# Patient Record
Sex: FEMALE | Race: WHITE | NOT HISPANIC OR LATINO | Employment: OTHER | URBAN - METROPOLITAN AREA
[De-identification: names, ages, dates, MRNs, and addresses within clinical notes are randomized per-mention and may not be internally consistent; named-entity substitution may affect disease eponyms.]

---

## 2018-10-09 ENCOUNTER — OFFICE VISIT (OUTPATIENT)
Dept: OPHTHALMOLOGY | Facility: CLINIC | Age: 78
End: 2018-10-09
Payer: COMMERCIAL

## 2018-10-09 DIAGNOSIS — H52.4 PRESBYOPIA: ICD-10-CM

## 2018-10-09 DIAGNOSIS — H43.812 POSTERIOR VITREOUS DETACHMENT, LEFT: Primary | ICD-10-CM

## 2018-10-09 PROCEDURE — 92014 COMPRE OPH EXAM EST PT 1/>: CPT | Performed by: OPHTHALMOLOGY

## 2018-10-09 PROCEDURE — 92015 DETERMINE REFRACTIVE STATE: CPT | Performed by: OPHTHALMOLOGY

## 2018-10-09 ASSESSMENT — CONF VISUAL FIELD
OD_NORMAL: 1
OS_NORMAL: 1

## 2018-10-09 ASSESSMENT — REFRACTION_WEARINGRX
OS_CYLINDER: +1.00
OD_SPHERE: +3.00
OS_SPHERE: +2.50
OS_ADD: +2.52
OD_ADD: +2.45
OD_CYLINDER: +1.00
OS_AXIS: 013
SPECS_TYPE: PAL
OD_AXIS: 011

## 2018-10-09 ASSESSMENT — VISUAL ACUITY
CORRECTION_TYPE: GLASSES
OD_CC: 20/25
OD_CC+: -2
OS_CC: 20/25
METHOD: SNELLEN - LINEAR
OS_CC+: +1

## 2018-10-09 ASSESSMENT — REFRACTION_MANIFEST
OD_SPHERE: +3.00
OS_SPHERE: +2.50
OS_ADD: +2.75
OS_AXIS: 170
OS_CYLINDER: +1.00
OD_ADD: +2.75
OD_CYLINDER: +1.00
OD_AXIS: 005

## 2018-10-09 ASSESSMENT — SLIT LAMP EXAM - LIDS: COMMENTS: 2+ DERMATOCHALASIS - UPPER LID

## 2018-10-09 ASSESSMENT — EXTERNAL EXAM - RIGHT EYE: OD_EXAM: NORMAL

## 2018-10-09 ASSESSMENT — TONOMETRY
OD_IOP_MMHG: 18
OS_IOP_MMHG: 18
IOP_METHOD: APPLANATION

## 2018-10-09 ASSESSMENT — CUP TO DISC RATIO
OS_RATIO: 0.4
OD_RATIO: 0.4

## 2018-10-09 ASSESSMENT — EXTERNAL EXAM - LEFT EYE: OS_EXAM: NORMAL

## 2018-10-09 NOTE — MR AVS SNAPSHOT
After Visit Summary   10/9/2018    Vivian Carlin    MRN: [id number]           Patient Information     Date Of Birth          1940        Visit Information        Provider Department      10/9/2018 1:00 PM Augusto Babin MD [facility]        Today's Diagnoses     Presbyopia    -  1    Posterior vitreous detachment, left          Care Instructions    Glasses Rx given - optional.  Use artificial tears up to 4 times daily both eyes.  (Refresh Tears, Systane Ultra/Balance, or Theratears)  Possible posterior vitreous detachment (sudden onset large floater and/or flashing lights) right eye discussed.  Call in June 2019 for an appointment in October 2019 for Complete Exam.    Dr. Babin [phone]            Follow-ups after your visit        Who to contact     If you have questions or need follow up information about today's clinic visit or your schedule please contact [facility] directly at [phone].  Normal or non-critical lab and imaging results will be communicated to you by MyChart, letter or phone within 4 business days after the clinic has received the results. If you do not hear from us within 7 days, please contact the clinic through MyChart or phone. If you have a critical or abnormal lab result, we will notify you by phone as soon as possible.  Submit refill requests through iLive or call your pharmacy and they will forward the refill request to us. Please allow 3 business days for your refill to be completed.          Additional Information About Your Visit        Care EveryWhere ID     This is your Care EveryWhere ID. This could be used by other organizations to access your [place] medical records  [id number]         Blood Pressure from Last 3 Encounters:   No data found for BP    Weight from Last 3 Encounters:   05/07/04 74.4 kg (164 lb)              Today, you had the following     No orders found for display       Primary Care Provider Office  Phone # Fax #    Hazel Meza [phone]       [facility] [address] [facility] [id number]        Equal Access to Services     DRAKEWINSOME HAO : Hadii aad ku hadchauritchie Lawandadonna, wajayceda joefranciscoha, moneta kaedmundo hayes, clarisa weber ashleyaguila landry lasimonalorena montgomery. So [facility] [phone].    ATENCIÓN: Si habla español, tiene a stephen disposición servicios gratuitos de asistencia lingüística. Llame al [phone].    We comply with applicable federal civil rights laws and [place] laws. We do not discriminate on the basis of race, color, national origin, age, disability, sex, sexual orientation, or gender identity.            Thank you!     Thank you for choosing [facility]  for your care. Our goal is always to provide you with excellent care. Hearing back from our patients is one way we can continue to improve our services. Please take a few minutes to complete the written survey that you may receive in the mail after your visit with us. Thank you!             Your Updated Medication List - Protect others around you: Learn how to safely use, store and throw away your medicines at www.disposemymeds.org.          This list is accurate as of 10/9/18  2:05 PM.  Always use your most recent med list.                   Brand Name Dispense Instructions for use Diagnosis    aspirin 325 MG tablet      Take  by mouth daily.        ATENOLOL PO      Take 1 tablet by mouth once.        CALCIUM + D PO      Take  by mouth.        hydrochlorothiazide 12.5 MG capsule    MICROZIDE     Take 12.5 mg by mouth daily.        immune globulin (IM only) injection    GAMASTAN    1.5ml    IM        LIPITOR 10 MG tablet   Generic drug:  atorvastatin      Take 10 mg by mouth daily.        LISINOPRIL PO      Take 1 tablet by mouth once.        MULTI-VITAMIN DAILY Tabs      Take  by mouth.        olopatadine 0.1 % ophthalmic solution    PATANOL    5 mL    Place 1 drop into both eyes 2 times daily as needed for  allergies.    Allergic conjunctivitis

## 2018-10-09 NOTE — PROGRESS NOTES
Current Eye Medications:  Artificial tears as needed both eyes     Subjective:  Here for complete today.  Not interested in new glasses today.  High blood pressure, 139/87 this am, which is high for her.  She takes meds for this but then when she exercises it goes down to 112/72, so they do not want her to change meds in fear it goes too low. Afib as well so it does slow her pulse with the meds too.    Objective:  See Ophthalmology Exam.       Assessment:  Stable eye exam.      ICD-10-CM    1. Posterior vitreous detachment, left H43.812 EYE EXAM (SIMPLE-NONBILLABLE)   2. Presbyopia H52.4 REFRACTIVE STATUS        Plan:  Glasses Rx given - optional.  Use artificial tears up to 4 times daily both eyes.  (Refresh Tears, Systane Ultra/Balance, or Theratears)  Possible posterior vitreous detachment (sudden onset large floater and/or flashing lights) right eye discussed.  Call in June 2019 for an appointment in October 2019 for Complete Exam.    Dr. Babin [phone]

## 2018-10-09 NOTE — LETTER
10/9/2018         RE: Vivian Carlin  [address] [place] [address]        Dear Colleague,    Thank you for referring your patient, Vivian Carlin, to the [facility]. Please see a copy of my visit note below.     Current Eye Medications:  Artificial tears as needed both eyes     Subjective:  Here for complete today.  Not interested in new glasses today.  High blood pressure, 139/87 this am, which is high for her.  She takes meds for this but then when she exercises it goes down to 112/72, so they do not want her to change meds in fear it goes too low. Afib as well so it does slow her pulse with the meds too.    Objective:  See Ophthalmology Exam.       Assessment:  Stable eye exam.      ICD-10-CM    1. Posterior vitreous detachment, left H43.812 EYE EXAM (SIMPLE-NONBILLABLE)   2. Presbyopia H52.4 REFRACTIVE STATUS        Plan:  Glasses Rx given - optional.  Use artificial tears up to 4 times daily both eyes.  (Refresh Tears, Systane Ultra/Balance, or Theratears)  Possible posterior vitreous detachment (sudden onset large floater and/or flashing lights) right eye discussed.  Call in June 2019 for an appointment in October 2019 for Complete Exam.    Dr. Babin [phone]           Again, thank you for allowing me to participate in the care of your patient.        Sincerely,        Augusto Babin MD

## 2018-10-09 NOTE — PATIENT INSTRUCTIONS
Glasses Rx given - optional.  Use artificial tears up to 4 times daily both eyes.  (Refresh Tears, Systane Ultra/Balance, or Theratears)  Possible posterior vitreous detachment (sudden onset large floater and/or flashing lights) right eye discussed.  Call in June 2019 for an appointment in October 2019 for Complete Exam.    Dr. Babin [phone]

## 2019-06-10 ENCOUNTER — TELEPHONE (OUTPATIENT)
Dept: OPHTHALMOLOGY | Facility: CLINIC | Age: 79
End: 2019-06-10

## 2019-06-10 NOTE — TELEPHONE ENCOUNTER
Reason for Call:  Other appointment    Detailed comments: Patient would like a return call to discuss double vision and getting an appointment asap due to travel. Please call to discuss thank you.     Phone Number Patient can be reached at: Other phone number:  [phone]    Best Time: any    Can we leave a detailed message on this number? YES    Call taken on 6/10/2019 at 9:08 AM by LUCAS ALAN

## 2019-06-11 ENCOUNTER — OFFICE VISIT (OUTPATIENT)
Dept: OPHTHALMOLOGY | Facility: CLINIC | Age: 79
End: 2019-06-11
Payer: MEDICARE

## 2019-06-11 DIAGNOSIS — H49.11 SUPERIOR OBLIQUE PALSY, RIGHT: Primary | ICD-10-CM

## 2019-06-11 PROCEDURE — 92012 INTRM OPH EXAM EST PATIENT: CPT | Performed by: OPHTHALMOLOGY

## 2019-06-11 ASSESSMENT — REFRACTION_WEARINGRX
OD_AXIS: 011
OS_ADD: +2.52
OS_AXIS: 013
SPECS_TYPE: PAL
OS_SPHERE: +2.50
OS_CYLINDER: +1.00
OD_ADD: +2.45
OD_CYLINDER: +1.00
OD_SPHERE: +3.00

## 2019-06-11 ASSESSMENT — VISUAL ACUITY
OS_CC: 20/25
OD_CC: 20/25
OS_CC+: -1+1
METHOD: SNELLEN - LINEAR
CORRECTION_TYPE: GLASSES
OD_CC+: -1

## 2019-06-11 ASSESSMENT — REFRACTION_FINALRX: OS_VPRISM: 4.0

## 2019-06-11 ASSESSMENT — SLIT LAMP EXAM - LIDS
COMMENTS: 2+ DERMATOCHALASIS - UPPER LID
COMMENTS: 2+ DERMATOCHALASIS - UPPER LID

## 2019-06-11 ASSESSMENT — EXTERNAL EXAM - RIGHT EYE: OD_EXAM: NORMAL

## 2019-06-11 ASSESSMENT — TONOMETRY
IOP_METHOD: APPLANATION
OD_IOP_MMHG: 20
OS_IOP_MMHG: 19

## 2019-06-11 ASSESSMENT — EXTERNAL EXAM - LEFT EYE: OS_EXAM: NORMAL

## 2019-06-11 ASSESSMENT — CUP TO DISC RATIO
OD_RATIO: 0.4 UD
OS_RATIO: 0.4 UD

## 2019-06-11 NOTE — PATIENT INSTRUCTIONS
Fresnel prism left eye is an option; may also opt to voluntarily close left eye intermittently or patch.  Return visit when you return from your trip for a muscle check.    Augusto Babin M.D.  [phone]

## 2019-06-11 NOTE — PROGRESS NOTES
Current Eye Medications:  none     Subjective: here for vertical diplopia.  Patient had an episode on 6-9-19 of blurred vision.  Then started to see vertically two images.  Had MRI yesterday and it was normal. Under a lot of stress, going to see her brother and he is not well.  Blood pressure was 190/107 yesterday in the ER, 150/92 when she left the ER many hours later.     Objective:  See Ophthalmology Exam.       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      Plan:  Fresnel prism left eye is an option; may also opt to voluntarily close left eye intermittently or patch.  Return visit when you return from your trip for a muscle check.    Augusto Babin M.D.  [phone]

## 2019-06-11 NOTE — LETTER
6/11/2019         RE: Vivian Carlin  [address] [place] [address]        Dear Colleague,    Thank you for referring your patient, Vivian Carlin, to the [facility]. Please see a copy of my visit note below.     Current Eye Medications:  none     Subjective: here for vertical diplopia.  Patient had an episode on 6-9-19 of blurred vision.  Then started to see vertically two images.  Had MRI yesterday and it was normal. Under a lot of stress, going to see her brother and he is not well.  Blood pressure was 190/107 yesterday in the ER, 150/92 when she left the ER many hours later.     Objective:  See Ophthalmology Exam.       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      Plan:  Fresnel prism left eye is an option; may also opt to voluntarily close left eye intermittently or patch.  Return visit when you return from your trip for a muscle check.    Augusto Babin M.D.  [phone]         Again, thank you for allowing me to participate in the care of your patient.        Sincerely,        Augusto Babin MD

## 2019-07-12 ENCOUNTER — TELEPHONE (OUTPATIENT)
Dept: INTERNAL MEDICINE | Facility: CLINIC | Age: 79
End: 2019-07-12

## 2019-07-12 NOTE — TELEPHONE ENCOUNTER
Patients son called clinic. Patients son requesting an appointment with Dr. Vernon for his mother- FSE. Scheduled future appointment, patients son voiced understanding.    Kristal RN-BSN  [facility]  [phone]

## 2019-07-18 ENCOUNTER — OFFICE VISIT (OUTPATIENT)
Dept: DERMATOLOGY | Facility: CLINIC | Age: 79
End: 2019-07-18
Payer: MEDICARE

## 2019-07-18 VITALS — SYSTOLIC BLOOD PRESSURE: 169 MMHG | HEART RATE: 59 BPM | OXYGEN SATURATION: 100 % | DIASTOLIC BLOOD PRESSURE: 87 MMHG

## 2019-07-18 DIAGNOSIS — D18.01 ANGIOMA OF SKIN: ICD-10-CM

## 2019-07-18 DIAGNOSIS — R23.8 VENOUS LAKE: ICD-10-CM

## 2019-07-18 DIAGNOSIS — L81.4 LENTIGO: ICD-10-CM

## 2019-07-18 DIAGNOSIS — L82.1 SEBORRHEIC KERATOSIS: Primary | ICD-10-CM

## 2019-07-18 DIAGNOSIS — D23.9 DERMAL NEVUS: ICD-10-CM

## 2019-07-18 DIAGNOSIS — S81.802D WOUND OF LEFT LOWER EXTREMITY, SUBSEQUENT ENCOUNTER: ICD-10-CM

## 2019-07-18 PROCEDURE — 99203 OFFICE O/P NEW LOW 30 MIN: CPT | Performed by: DERMATOLOGY

## 2019-07-18 RX ORDER — DILTIAZEM HYDROCHLORIDE 120 MG/1
1 CAPSULE, COATED, EXTENDED RELEASE ORAL 2 TIMES DAILY
COMMUNITY
Start: 2015-04-30

## 2019-07-18 NOTE — PATIENT INSTRUCTIONS
Wound Care Instructions     FOR SUPERFICIAL WOUNDS     [facility] [phone]                 AFTER 24 HOURS YOU SHOULD REMOVE THE BANDAGE AND BEGIN DAILY DRESSING CHANGES AS FOLLOWS:     1) Remove Dressing.     2) Clean and dry the area with tap water using a Q-tip or sterile gauze pad.     3) Apply Vaseline, Aquaphor, Polysporin ointment or Bacitracin ointment over entire wound.  Do NOT use Neosporin ointment.     4) Cover the wound with a band-aid, or a sterile non-stick gauze pad and micropore paper tape    REPEAT THESE INSTRUCTIONS AT LEAST ONCE A DAY UNTIL THE WOUND HAS COMPLETELY HEALED.    It is an old wives tale that a wound heals better when it is exposed to air and allowed to dry out. The wound will heal faster with a better cosmetic result if it is kept moist with ointment and covered with a bandage.    **Do not let the wound dry out.**    Supplies Needed:      *Cotton tipped applicators (Q-tips)    *Vaseline, Aquaphor, Polysporin or Bacitracin Ointment (NOT NEOSPORIN)    *Band-aids or non-stick gauze pads and micropore paper tape.      PATIENT INFORMATION:    During the healing process you will notice a number of changes. All wounds develop a small halo of redness surrounding the wound.  This means healing is occurring. Severe itching with extensive redness usually indicates sensitivity to the ointment or bandage tape used to dress the wound.  You should call our office if this develops.      Swelling  and/or discoloration around your surgical site is common, particularly when performed around the eye.    All wounds normally drain.  The larger the wound the more drainage there will be.  After 7-10 days, you will notice the wound beginning to shrink and new skin will begin to grow.  The wound is healed when you can see skin has formed over the entire area.  A healed wound has a healthy, shiny look to the surface and is red to dark pink in color to normalize.  Wounds may take approximately  4-6 weeks to heal.  Larger wounds may take 6-8 weeks.  After the wound is healed you may discontinue dressing changes.    You may experience a sensation of tightness as your wound heals. This is normal and will gradually subside.    Your healed wound may be sensitive to temperature changes. This sensitivity improves with time, but if you re having a lot of discomfort, try to avoid temperature extremes.    Patients frequently experience itching after their wound appears to have healed because of the continue healing under the skin.  Plain Vaseline will help relieve the itching.

## 2019-07-18 NOTE — PROGRESS NOTES
Vivian Carlin is a 79 year old year old female patient here today for spot on left leg.  This was bit by a fish in [place] and now is slowly healing,  She also notes spots on lip and back.    .  Patient states this has been present for a while.  Patient reports the following symptoms:  none.  Patient reports the following previous treatments none.  Patient reports the following modifying factors none.  Associated symptoms: none.  Patient has no other skin complaints today.  Remainder of the HPI, Meds, PMH, Allergies, FH, and SH was reviewed in chart.      Past Medical History:   Diagnosis Date     HTN (hypertension), benign     10 years     Hyperlipaemia     5 years     Nonsenile cataract        History reviewed. No pertinent surgical history.     Family History   Problem Relation Age of Onset     Squamous cell carcinoma Father        Social History     Socioeconomic History     Marital status:      Spouse name: Not on file     Number of children: Not on file     Years of education: Not on file     Highest education level: Not on file   Occupational History     Not on file   Social Needs     Financial resource strain: Not on file     Food insecurity:     Worry: Not on file     Inability: Not on file     Transportation needs:     Medical: Not on file     Non-medical: Not on file   Tobacco Use     Smoking status: Passive Smoke Exposure - Never Smoker     Smokeless tobacco: Never Used     Tobacco comment: years ago   Substance and Sexual Activity     Alcohol use: Yes     Comment: social      Drug use: No     Sexual activity: Not on file   Lifestyle     Physical activity:     Days per week: Not on file     Minutes per session: Not on file     Stress: Not on file   Relationships     Social connections:     Talks on phone: Not on file     Gets together: Not on file     Attends Evangelical service: Not on file     Active member of club or organization: Not on file     Attends meetings of clubs or organizations: Not on  file     Relationship status: Not on file     Intimate partner violence:     Fear of current or ex partner: Not on file     Emotionally abused: Not on file     Physically abused: Not on file     Forced sexual activity: Not on file   Other Topics Concern     Not on file   Social History Narrative     Not on file       Outpatient Encounter Medications as of 7/18/2019   Medication Sig Dispense Refill     ATENOLOL PO Take 1 tablet by mouth once.       atorvastatin (LIPITOR) 10 MG tablet Take 10 mg by mouth daily.       Calcium Carbonate-Vitamin D (CALCIUM + D PO) Take  by mouth.       diltiazem ER COATED BEADS (CARDIZEM CD/CARTIA XT) 120 MG 24 hr capsule Take 1 capsule by mouth 2 times daily       hydrochlorothiazide (MICROZIDE) 12.5 MG capsule Take 12.5 mg by mouth daily.       IMMUNE GLOBULIN (HUMAN) IM INJ IM 1.5ml 0     LISINOPRIL PO Take 40 mg by mouth once        Multiple Vitamin (MULTI-VITAMIN DAILY) TABS Take  by mouth.       olopatadine (PATANOL) 0.1 % ophthalmic solution Place 1 drop into both eyes 2 times daily as needed for allergies. 5 mL 12     rivaroxaban ANTICOAGULANT (XARELTO) 15 MG TABS tablet Take 15 mg by mouth daily (with dinner)       aspirin 325 MG tablet Take  by mouth daily.       No facility-administered encounter medications on file as of 7/18/2019.              Review Of Systems  Skin: As above  Eyes: negative  Ears/Nose/Throat: negative  Respiratory: No shortness of breath, dyspnea on exertion, cough, or hemoptysis  Cardiovascular: negative  Gastrointestinal: negative  Genitourinary: negative  Musculoskeletal: negative  Neurologic: negative  Psychiatric: negative  Hematologic/Lymphatic/Immunologic: negative  Endocrine: negative      O:   NAD, WDWN, Alert & Oriented, Mood & Affect wnl, Vitals stable   Here today with hsuband    /87   Pulse 59   SpO2 100%    General appearance normal   Vitals stable   Alert, oriented and in no acute distress      Following lymph nodes palpated:  Occipital, Cervical, Supraclavicular no lad     Mid lower cut liup blanchable red papule  L  Ankle granulating wound almost completelyre epithelized   Stuck on papules and brown macules on trunk and ext   Red papules on trunk  Flesh colored papules on trunk     The remainder of the full exam was unremarkable; the following areas were examined:  conjunctiva/lids, oral mucosa, neck, peripheral vascular system, abdomen, lymph nodes, digits/nails, eccrine and apocrine glands, scalp/hair, face, neck, chest, abdomen, buttocks, back, RUE, LUE, RLE, LLE       Eyes: Conjunctivae/lids:Normal     ENT: Lips, buccal mucosa, tongue: normal    MSK:Normal    Cardiovascular: peripheral edema none    Pulm: Breathing Normal    Lymph Nodes: No Head and Neck Lymphadenopathy     Neuro/Psych: Orientation:Normal; Mood/Affect:Normal      A/P:  1. Seborrheic keratosis, lentigo, angioma, dermal nevus, venous lake  2. HEaling leg wound  Almost healed  Wound healing on legs discussed with patient   aquaphor daily until healed  BENIGN LESIONS DISCUSSED WITH PATIENT:  I discussed the specifics of tumor, prognosis, and genetics of benign lesions.  I explained that treatment of these lesions would be purely cosmetic and not medically neccessary.  I discussed with patient different removal options including excision, cautery and /or laser.      Nature and genetics of benign skin lesions dicussed with patient.  Signs and Symptoms of skin cancer discussed with patient.  Patient encouraged to perform monthly skin exams.  UV precautions reviewed with patient.  Skin care regimen reviewed with patient: Eliminate harsh soaps, i.e. Dial, zest, irsih spring; Mild soaps such as Cetaphil or Dove sensitive skin, avoid hot or cold showers, aggressive use of emollients including vanicream, cetaphil or cerave discussed with patient.    Risks of non-melanoma skin cancer discussed with patient   Return to clinic 12 months  Vivian to follow up with Primary Care  provider regarding elevated blood pressure.

## 2019-07-18 NOTE — LETTER
7/18/2019         RE: Vivian Carlin  [address] [place] [address]        Dear Colleague,    Thank you for referring your patient, Vivian Carlin, to the [facility]. Please see a copy of my visit note below.    Vivian Carlin is a 79 year old year old female patient here today for spot on left leg.  This was bit by a fish in [place] and now is slowly healing,  She also notes spots on lip and back.    .  Patient states this has been present for a while.  Patient reports the following symptoms:  none.  Patient reports the following previous treatments none.  Patient reports the following modifying factors none.  Associated symptoms: none.  Patient has no other skin complaints today.  Remainder of the HPI, Meds, PMH, Allergies, FH, and SH was reviewed in chart.      Past Medical History:   Diagnosis Date     HTN (hypertension), benign     10 years     Hyperlipaemia     5 years     Nonsenile cataract        History reviewed. No pertinent surgical history.     Family History   Problem Relation Age of Onset     Squamous cell carcinoma Father        Social History     Socioeconomic History     Marital status:      Spouse name: Not on file     Number of children: Not on file     Years of education: Not on file     Highest education level: Not on file   Occupational History     Not on file   Social Needs     Financial resource strain: Not on file     Food insecurity:     Worry: Not on file     Inability: Not on file     Transportation needs:     Medical: Not on file     Non-medical: Not on file   Tobacco Use     Smoking status: Passive Smoke Exposure - Never Smoker     Smokeless tobacco: Never Used     Tobacco comment: years ago   Substance and Sexual Activity     Alcohol use: Yes     Comment: social      Drug use: No     Sexual activity: Not on file   Lifestyle     Physical activity:     Days per week: Not on file     Minutes per session: Not on file     Stress: Not on file    Relationships     Social connections:     Talks on phone: Not on file     Gets together: Not on file     Attends Bahai service: Not on file     Active member of club or organization: Not on file     Attends meetings of clubs or organizations: Not on file     Relationship status: Not on file     Intimate partner violence:     Fear of current or ex partner: Not on file     Emotionally abused: Not on file     Physically abused: Not on file     Forced sexual activity: Not on file   Other Topics Concern     Not on file   Social History Narrative     Not on file       Outpatient Encounter Medications as of 7/18/2019   Medication Sig Dispense Refill     ATENOLOL PO Take 1 tablet by mouth once.       atorvastatin (LIPITOR) 10 MG tablet Take 10 mg by mouth daily.       Calcium Carbonate-Vitamin D (CALCIUM + D PO) Take  by mouth.       diltiazem ER COATED BEADS (CARDIZEM CD/CARTIA XT) 120 MG 24 hr capsule Take 1 capsule by mouth 2 times daily       hydrochlorothiazide (MICROZIDE) 12.5 MG capsule Take 12.5 mg by mouth daily.       IMMUNE GLOBULIN (HUMAN) IM INJ IM 1.5ml 0     LISINOPRIL PO Take 40 mg by mouth once        Multiple Vitamin (MULTI-VITAMIN DAILY) TABS Take  by mouth.       olopatadine (PATANOL) 0.1 % ophthalmic solution Place 1 drop into both eyes 2 times daily as needed for allergies. 5 mL 12     rivaroxaban ANTICOAGULANT (XARELTO) 15 MG TABS tablet Take 15 mg by mouth daily (with dinner)       aspirin 325 MG tablet Take  by mouth daily.       No facility-administered encounter medications on file as of 7/18/2019.              Review Of Systems  Skin: As above  Eyes: negative  Ears/Nose/Throat: negative  Respiratory: No shortness of breath, dyspnea on exertion, cough, or hemoptysis  Cardiovascular: negative  Gastrointestinal: negative  Genitourinary: negative  Musculoskeletal: negative  Neurologic: negative  Psychiatric: negative  Hematologic/Lymphatic/Immunologic: negative  Endocrine: negative      O:    NAD, WDWN, Alert & Oriented, Mood & Affect wnl, Vitals stable   Here today with hsuband    /87   Pulse 59   SpO2 100%    General appearance normal   Vitals stable   Alert, oriented and in no acute distress      Following lymph nodes palpated: Occipital, Cervical, Supraclavicular no lad     Mid lower cut liup blanchable red papule  L  Ankle granulating wound almost completelyre epithelized   Stuck on papules and brown macules on trunk and ext   Red papules on trunk  Flesh colored papules on trunk     The remainder of the full exam was unremarkable; the following areas were examined:  conjunctiva/lids, oral mucosa, neck, peripheral vascular system, abdomen, lymph nodes, digits/nails, eccrine and apocrine glands, scalp/hair, face, neck, chest, abdomen, buttocks, back, RUE, LUE, RLE, LLE       Eyes: Conjunctivae/lids:Normal     ENT: Lips, buccal mucosa, tongue: normal    MSK:Normal    Cardiovascular: peripheral edema none    Pulm: Breathing Normal    Lymph Nodes: No Head and Neck Lymphadenopathy     Neuro/Psych: Orientation:Normal; Mood/Affect:Normal      A/P:  1. Seborrheic keratosis, lentigo, angioma, dermal nevus, venous lake  2. HEaling leg wound  Almost healed  Wound healing on legs discussed with patient   aquaphor daily until healed  BENIGN LESIONS DISCUSSED WITH PATIENT:  I discussed the specifics of tumor, prognosis, and genetics of benign lesions.  I explained that treatment of these lesions would be purely cosmetic and not medically neccessary.  I discussed with patient different removal options including excision, cautery and /or laser.      Nature and genetics of benign skin lesions dicussed with patient.  Signs and Symptoms of skin cancer discussed with patient.  Patient encouraged to perform monthly skin exams.  UV precautions reviewed with patient.  Skin care regimen reviewed with patient: Eliminate harsh soaps, i.e. Dial, zest, irsih spring; Mild soaps such as Cetaphil or Dove sensitive skin,  avoid hot or cold showers, aggressive use of emollients including vanicream, cetaphil or cerave discussed with patient.    Risks of non-melanoma skin cancer discussed with patient   Return to clinic 12 months  Vivian to follow up with Primary Care provider regarding elevated blood pressure.      Again, thank you for allowing me to participate in the care of your patient.        Sincerely,        Darrin Vernon MD

## 2019-07-23 ENCOUNTER — OFFICE VISIT (OUTPATIENT)
Dept: OPHTHALMOLOGY | Facility: CLINIC | Age: 79
End: 2019-07-23
Payer: MEDICARE

## 2019-07-23 DIAGNOSIS — H49.11 SUPERIOR OBLIQUE PALSY, RIGHT: Primary | ICD-10-CM

## 2019-07-23 PROCEDURE — 92012 INTRM OPH EXAM EST PATIENT: CPT | Performed by: OPHTHALMOLOGY

## 2019-07-23 ASSESSMENT — EXTERNAL EXAM - LEFT EYE: OS_EXAM: NORMAL

## 2019-07-23 ASSESSMENT — VISUAL ACUITY
OS_CC: 20/30
OD_CC+: -2
OD_CC: 20/30
METHOD: SNELLEN - LINEAR
CORRECTION_TYPE: GLASSES
OS_CC+: -2

## 2019-07-23 ASSESSMENT — CUP TO DISC RATIO
OD_RATIO: 0.4 UD
OS_RATIO: 0.4 UD

## 2019-07-23 ASSESSMENT — EXTERNAL EXAM - RIGHT EYE: OD_EXAM: NORMAL

## 2019-07-23 ASSESSMENT — SLIT LAMP EXAM - LIDS
COMMENTS: 2+ DERMATOCHALASIS - UPPER LID
COMMENTS: 2+ DERMATOCHALASIS - UPPER LID

## 2019-07-23 NOTE — PATIENT INSTRUCTIONS
Use artificial tears up to 4 times daily both eyes. (Refresh Tears, Systane Ultra/Balance, or Theratears)   Return visit 3 months for a complete exam and a muscle check.    Augusto Babin M.D.  [phone]

## 2019-07-23 NOTE — LETTER
7/23/2019         RE: Vivian Carlin  [address] [place] [address]        Dear Colleague,    Thank you for referring your patient, Vivian Carlin, to the [facility]. Please see a copy of my visit note below.     Current Eye Medications:  Artificial Tears both eyes prn      Subjective:  Here for MD and Muscle check. Did not get the prism, feels the left eye was easier to close.  It has gotten better, only tires when she reads sometimes now and doesn't usually have to close the eye.  HTN has been normal for her.     Objective:  See Ophthalmology Exam.       Assessment:  Recent right 4th nerve palsy improving.      Plan:  Use artificial tears up to 4 times daily both eyes. (Refresh Tears, Systane Ultra/Balance, or Theratears)   Return visit 3 months for a complete exam and a muscle check.    Augusto Babin M.D.  [phone]         Again, thank you for allowing me to participate in the care of your patient.        Sincerely,        Augusto Babin MD

## 2019-07-23 NOTE — PROGRESS NOTES
Current Eye Medications:  Artificial Tears both eyes prn      Subjective:  Here for MD and Muscle check. Did not get the prism, feels the left eye was easier to close.  It has gotten better, only tires when she reads sometimes now and doesn't usually have to close the eye.  HTN has been normal for her.     Objective:  See Ophthalmology Exam.       Assessment:  Recent right 4th nerve palsy improving.      Plan:  Use artificial tears up to 4 times daily both eyes. (Refresh Tears, Systane Ultra/Balance, or Theratears)   Return visit 3 months for a complete exam and a muscle check.    Augusto Babin M.D.  [phone]

## 2019-10-29 ENCOUNTER — OFFICE VISIT (OUTPATIENT)
Dept: OPHTHALMOLOGY | Facility: CLINIC | Age: 79
End: 2019-10-29
Payer: MEDICARE

## 2019-10-29 DIAGNOSIS — H25.813 COMBINED FORMS OF AGE-RELATED CATARACT OF BOTH EYES: Primary | ICD-10-CM

## 2019-10-29 DIAGNOSIS — H02.834 DERMATOCHALASIS OF BOTH UPPER EYELIDS: ICD-10-CM

## 2019-10-29 DIAGNOSIS — H52.4 PRESBYOPIA: ICD-10-CM

## 2019-10-29 DIAGNOSIS — H43.812 POSTERIOR VITREOUS DETACHMENT, LEFT: ICD-10-CM

## 2019-10-29 DIAGNOSIS — H02.831 DERMATOCHALASIS OF BOTH UPPER EYELIDS: ICD-10-CM

## 2019-10-29 DIAGNOSIS — H49.11 SUPERIOR OBLIQUE PALSY, RIGHT: ICD-10-CM

## 2019-10-29 PROCEDURE — 92014 COMPRE OPH EXAM EST PT 1/>: CPT | Performed by: OPHTHALMOLOGY

## 2019-10-29 PROCEDURE — 92015 DETERMINE REFRACTIVE STATE: CPT | Mod: GY | Performed by: OPHTHALMOLOGY

## 2019-10-29 ASSESSMENT — TONOMETRY
OS_IOP_MMHG: 18
OD_IOP_MMHG: 18
IOP_METHOD: APPLANATION

## 2019-10-29 ASSESSMENT — SLIT LAMP EXAM - LIDS
COMMENTS: 2+ DERMATOCHALASIS - UPPER LID
COMMENTS: 2-3+ DERMATOCHALASIS - UPPER LID

## 2019-10-29 ASSESSMENT — VISUAL ACUITY
OS_CC: 20/25
METHOD: SNELLEN - LINEAR
OD_CC: 20/25
OS_CC+: -1
OD_CC+: -1+1
CORRECTION_TYPE: GLASSES

## 2019-10-29 ASSESSMENT — REFRACTION_WEARINGRX
OD_CYLINDER: +1.00
OS_ADD: +2.52
OD_SPHERE: +2.50
SPECS_TYPE: PAL
OS_CYLINDER: +1.00
OD_ADD: +2.52
OS_SPHERE: +2.50
OD_AXIS: 011
OS_AXIS: 013

## 2019-10-29 ASSESSMENT — EXTERNAL EXAM - LEFT EYE: OS_EXAM: NORMAL

## 2019-10-29 ASSESSMENT — REFRACTION_MANIFEST
OS_CYLINDER: +1.00
OD_AXIS: 010
OD_ADD: +2.75
OD_CYLINDER: +1.25
OS_AXIS: 002
OS_SPHERE: +2.50
OD_SPHERE: +2.75
OS_ADD: +2.75

## 2019-10-29 ASSESSMENT — EXTERNAL EXAM - RIGHT EYE: OD_EXAM: NORMAL

## 2019-10-29 ASSESSMENT — CUP TO DISC RATIO
OD_RATIO: 0.4
OS_RATIO: 0.4

## 2019-10-29 ASSESSMENT — CONF VISUAL FIELD
OD_NORMAL: 1
OS_NORMAL: 1

## 2019-10-29 NOTE — PROGRESS NOTES
Current Eye Medications:  Systane both eyes prn.     Subjective:  Here for Complete and muscle check. Has some concern that her MARCELINO is coming down more over the past couple months. Has to hold it up in order to see peripherally better.  Blood pressure is normal. No diplopia and doing well. Floaters are still there off and on but they are not new.     Objective:  See Ophthalmology Exam.       Assessment:  Recent right 4th nerve palsy resolved.      ICD-10-CM    1. Combined forms of age-related cataract, mild, of both eyes H25.813    2. 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 Dermatochalasis of both upper eyelids H02.831 OPHTHALMOLOGY ADULT REFERRAL    H02.834    4. Posterior vitreous detachment, left H43.812    5. Presbyopia H52.4 REFRACTIVE STATUS     EYE EXAM (SIMPLE-NONBILLABLE)        Plan:  Glasses Rx given - optional.  Use artificial tears up to 4 times daily both eyes.  (Refresh Tears, Systane Ultra/Balance, or Theratears)  Referral given for eyelid evaluation if you wish to schedule.   Call in June 2020 for an appointment in October 2020 for Complete Exam.    Dr. Babin [phone]

## 2019-10-29 NOTE — PATIENT INSTRUCTIONS
Glasses Rx given - optional.  Use artificial tears up to 4 times daily both eyes.  (Refresh Tears, Systane Ultra/Balance, or Theratears)  Referral given for eyelid evaluation if you wish to schedule.   Call in June 2020 for an appointment in October 2020 for Complete Exam.    Dr. Babin [phone]     negative

## 2019-10-29 NOTE — LETTER
10/29/2019         RE: Vivian Carlin  [address] [place] [address]        Dear Colleague,    Thank you for referring your patient, Vivian Carlin, to the [facility]. Please see a copy of my visit note below.     Current Eye Medications:  Systane both eyes prn.     Subjective:  Here for Complete and muscle check. Has some concern that her MARCELINO is coming down more over the past couple months. Has to hold it up in order to see peripherally better.  Blood pressure is normal. No diplopia and doing well. Floaters are still there off and on but they are not new.     Objective:  See Ophthalmology Exam.       Assessment:  Recent right 4th nerve palsy resolved.      ICD-10-CM    1. Combined forms of age-related cataract, mild, of both eyes H25.813    2. 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 Dermatochalasis of both upper eyelids H02.831 OPHTHALMOLOGY ADULT REFERRAL    H02.834    4. Posterior vitreous detachment, left H43.812    5. Presbyopia H52.4 REFRACTIVE STATUS     EYE EXAM (SIMPLE-NONBILLABLE)        Plan:  Glasses Rx given - optional.  Use artificial tears up to 4 times daily both eyes.  (Refresh Tears, Systane Ultra/Balance, or Theratears)  Referral given for eyelid evaluation if you wish to schedule.   Call in June 2020 for an appointment in October 2020 for Complete Exam.    Dr. Babin [phone]           Again, thank you for allowing me to participate in the care of your patient.        Sincerely,        Augusto Babin MD

## 2021-04-29 ENCOUNTER — OFFICE VISIT (OUTPATIENT)
Dept: OPHTHALMOLOGY | Facility: CLINIC | Age: 81
End: 2021-04-29
Payer: MEDICARE

## 2021-04-29 DIAGNOSIS — Z01.01 ENCOUNTER FOR EXAMINATION OF EYES AND VISION WITH ABNORMAL FINDINGS: ICD-10-CM

## 2021-04-29 DIAGNOSIS — H43.813 POSTERIOR VITREOUS DETACHMENT OF BOTH EYES: ICD-10-CM

## 2021-04-29 DIAGNOSIS — H49.11 SUPERIOR OBLIQUE PALSY, RIGHT: ICD-10-CM

## 2021-04-29 DIAGNOSIS — H25.813 COMBINED FORMS OF AGE-RELATED CATARACT OF BOTH EYES: Primary | ICD-10-CM

## 2021-04-29 DIAGNOSIS — H52.4 PRESBYOPIA: ICD-10-CM

## 2021-04-29 PROCEDURE — 92014 COMPRE OPH EXAM EST PT 1/>: CPT | Performed by: OPHTHALMOLOGY

## 2021-04-29 PROCEDURE — 92015 DETERMINE REFRACTIVE STATE: CPT | Mod: GY | Performed by: OPHTHALMOLOGY

## 2021-04-29 ASSESSMENT — CONF VISUAL FIELD
OD_NORMAL: 1
OS_NORMAL: 1

## 2021-04-29 ASSESSMENT — REFRACTION_MANIFEST
OD_ADD: +3.00
OS_CYLINDER: +1.25
OS_AXIS: 173
OD_AXIS: 015
OD_CYLINDER: +2.00
OD_SPHERE: +2.00
OS_SPHERE: +2.75
OS_ADD: +3.00

## 2021-04-29 ASSESSMENT — VISUAL ACUITY
CORRECTION_TYPE: GLASSES
OD_CC: 20/50-
OD_CC: J1+-
METHOD: SNELLEN - LINEAR
OS_CC: J1+
OS_CC: 20/30-

## 2021-04-29 ASSESSMENT — CUP TO DISC RATIO
OS_RATIO: 0.4
OD_RATIO: 0.4

## 2021-04-29 ASSESSMENT — REFRACTION_WEARINGRX
SPECS_TYPE: PAL
OS_SPHERE: +2.75
SPECS_TYPE: PAL
OD_SPHERE: +2.50
OD_ADD: +2.00
OD_CYLINDER: +1.50
OD_AXIS: 152
OS_SPHERE: +2.50
OS_AXIS: 017
OS_AXIS: 027
OD_SPHERE: +2.75
OD_CYLINDER: +1.25
OS_ADD: +2.00
OD_AXIS: 015
OS_ADD: +2.50
OS_CYLINDER: +1.00
OS_CYLINDER: +1.50
OD_ADD: +2.50

## 2021-04-29 ASSESSMENT — EXTERNAL EXAM - RIGHT EYE: OD_EXAM: NORMAL

## 2021-04-29 ASSESSMENT — TONOMETRY
OD_IOP_MMHG: 18
IOP_METHOD: APPLANATION
OS_IOP_MMHG: 18

## 2021-04-29 ASSESSMENT — EXTERNAL EXAM - LEFT EYE: OS_EXAM: NORMAL

## 2021-04-29 NOTE — PATIENT INSTRUCTIONS
Glasses Rx given - optional.  Use artificial tears up to 4 times daily both eyes. (Refresh Tears, Systane Ultra/Balance, or Theratears).Call in December 2021 for an appointment in April 2022 for Complete Exam    Dr. Babin [phone]

## 2021-04-29 NOTE — LETTER
4/29/2021         RE: Vivian Carlin  [address] [place] [address]        Dear Colleague,    Thank you for referring your patient, Vivian Carlin, to the [facility]. Please see a copy of my visit note below.     Current Eye Medications:  Systane as needed daily     Subjective:  Complete eye exam   Doing well.  No visual complaints.     Objective:  See Ophthalmology Exam.       Assessment:  Stable eye exam.      ICD-10-CM    1. Combined forms of age-related cataract, mild, of both eyes  H25.813    2. Hx of superior oblique palsy, right  H49.11    3. Posterior vitreous detachment of both eyes  H43.813    4. Encounter for examination of eyes and vision with abnormal findings  Z01.01    5. Presbyopia  H52.4 REFRACTIVE STATUS     EYE EXAM (SIMPLE-NONBILLABLE)        Plan:  Glasses Rx given - optional.  Use artificial tears up to 4 times daily both eyes. (Refresh Tears, Systane Ultra/Balance, or Theratears).  Call in December 2021 for an appointment in April 2022 for Complete Exam    Dr. Babin [phone]             Again, thank you for allowing me to participate in the care of your patient.        Sincerely,        Augusto Babin MD

## 2021-04-29 NOTE — PROGRESS NOTES
Current Eye Medications:  Systane as needed daily     Subjective:  Complete eye exam   Doing well.  No visual complaints.     Objective:  See Ophthalmology Exam.       Assessment:  Stable eye exam.      ICD-10-CM    1. Combined forms of age-related cataract, mild, of both eyes  H25.813    2. Hx of superior oblique palsy, right  H49.11    3. Posterior vitreous detachment of both eyes  H43.813    4. Encounter for examination of eyes and vision with abnormal findings  Z01.01    5. Presbyopia  H52.4 REFRACTIVE STATUS     EYE EXAM (SIMPLE-NONBILLABLE)        Plan:  Glasses Rx given - optional.  Use artificial tears up to 4 times daily both eyes. (Refresh Tears, Systane Ultra/Balance, or Theratears).  Call in December 2021 for an appointment in April 2022 for Complete Exam    Dr. Babin [phone]

## 2021-04-30 PROBLEM — H43.813 POSTERIOR VITREOUS DETACHMENT OF BOTH EYES: Status: ACTIVE | Noted: 2021-04-30

## 2021-05-13 ENCOUNTER — OFFICE VISIT (OUTPATIENT)
Dept: DERMATOLOGY | Facility: CLINIC | Age: 81
End: 2021-05-13
Payer: MEDICARE

## 2021-05-13 ENCOUNTER — TELEPHONE (OUTPATIENT)
Dept: DERMATOLOGY | Facility: CLINIC | Age: 81
End: 2021-05-13

## 2021-05-13 VITALS — OXYGEN SATURATION: 98 % | HEART RATE: 57 BPM | DIASTOLIC BLOOD PRESSURE: 85 MMHG | SYSTOLIC BLOOD PRESSURE: 175 MMHG

## 2021-05-13 DIAGNOSIS — C44.111 BASAL CELL CARCINOMA (BCC) OF LEFT ORBIT: ICD-10-CM

## 2021-05-13 DIAGNOSIS — D04.39 SQUAMOUS CELL CARCINOMA IN SITU (SCCIS) OF SKIN OF RIGHT CHEEK: ICD-10-CM

## 2021-05-13 DIAGNOSIS — D23.9 DERMAL NEVUS: ICD-10-CM

## 2021-05-13 DIAGNOSIS — D18.01 ANGIOMA OF SKIN: ICD-10-CM

## 2021-05-13 DIAGNOSIS — L81.4 LENTIGO: ICD-10-CM

## 2021-05-13 DIAGNOSIS — L82.1 SEBORRHEIC KERATOSIS: Primary | ICD-10-CM

## 2021-05-13 PROCEDURE — 99213 OFFICE O/P EST LOW 20 MIN: CPT | Mod: 25 | Performed by: DERMATOLOGY

## 2021-05-13 PROCEDURE — 11102 TANGNTL BX SKIN SINGLE LES: CPT | Performed by: DERMATOLOGY

## 2021-05-13 PROCEDURE — 88331 PATH CONSLTJ SURG 1 BLK 1SPC: CPT | Performed by: DERMATOLOGY

## 2021-05-13 PROCEDURE — 11103 TANGNTL BX SKIN EA SEP/ADDL: CPT | Performed by: DERMATOLOGY

## 2021-05-13 NOTE — TELEPHONE ENCOUNTER
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

## 2021-05-13 NOTE — LETTER
5/13/2021         RE: Vivian Carlin  [address] [place] [address]        Dear Colleague,    Thank you for referring your patient, Vivian Carlin, to the [facility]. Please see a copy of my visit note below.    Vivian Carlin is an extremely pleasant 80 year old year old female patient here today for evaluation and managment of tender lipma right forehead. Patient has no other skin complaints today.  Remainder of the HPI, Meds, PMH, Allergies, FH, and SH was reviewed in chart.      Past Medical History:   Diagnosis Date     HTN (hypertension), benign     10 years     Hyperlipaemia     5 years     Nonsenile cataract      Strabismus        History reviewed. No pertinent surgical history.     Family History   Problem Relation Age of Onset     Squamous cell carcinoma Father        Social History     Socioeconomic History     Marital status:      Spouse name: Not on file     Number of children: Not on file     Years of education: Not on file     Highest education level: Not on file   Occupational History     Not on file   Social Needs     Financial resource strain: Not on file     Food insecurity     Worry: Not on file     Inability: Not on file     Transportation needs     Medical: Not on file     Non-medical: Not on file   Tobacco Use     Smoking status: Passive Smoke Exposure - Never Smoker     Smokeless tobacco: Never Used     Tobacco comment: years ago   Substance and Sexual Activity     Alcohol use: Yes     Comment: social      Drug use: No     Sexual activity: Not on file   Lifestyle     Physical activity     Days per week: Not on file     Minutes per session: Not on file     Stress: Not on file   Relationships     Social connections     Talks on phone: Not on file     Gets together: Not on file     Attends Orthodoxy service: Not on file     Active member of club or organization: Not on file     Attends meetings of clubs or organizations: Not on file      Relationship status: Not on file     Intimate partner violence     Fear of current or ex partner: Not on file     Emotionally abused: Not on file     Physically abused: Not on file     Forced sexual activity: Not on file   Other Topics Concern     Not on file   Social History Narrative     Not on file       Outpatient Encounter Medications as of 5/13/2021   Medication Sig Dispense Refill     aspirin 325 MG tablet Take  by mouth daily.       ATENOLOL PO Take 1 tablet by mouth once.       atorvastatin (LIPITOR) 10 MG tablet Take 10 mg by mouth daily.       Calcium Carbonate-Vitamin D (CALCIUM + D PO) Take  by mouth.       diltiazem ER COATED BEADS (CARDIZEM CD/CARTIA XT) 120 MG 24 hr capsule Take 1 capsule by mouth 2 times daily       hydrochlorothiazide (MICROZIDE) 12.5 MG capsule Take 12.5 mg by mouth daily.       IMMUNE GLOBULIN (HUMAN) IM INJ IM 1.5ml 0     LISINOPRIL PO Take 40 mg by mouth once        Multiple Vitamin (MULTI-VITAMIN DAILY) TABS Take  by mouth.       rivaroxaban ANTICOAGULANT (XARELTO) 15 MG TABS tablet Take 15 mg by mouth daily (with dinner)       No facility-administered encounter medications on file as of 5/13/2021.              O:   NAD, WDWN, Alert & Oriented, Mood & Affect wnl, Vitals stable   Here today alone   BP (!) 175/85   Pulse 57   SpO2 98%    General appearance normal   Vitals stable   Alert, oriented and in no acute distress     R forehead movable 1.6cm nodule      Eyes: Conjunctivae/lids:Normal     ENT: Lips, buccal mucosa, tongue: normal    MSK:Normal    Cardiovascular: peripheral edema none    Pulm: Breathing Normal    Neuro/Psych: Orientation:Alert and Orientedx3 ; Mood/Affect:normal       A/P:  1. R forehead lipoma  EXCISION OF BENIGN LESION AND INT: After thorough discussion of PGACAC, consent obtained, anesthesia and prep, the margins of the lesion were identified and an elliptical incision was made encompassing the lesion. The incisions were made through the skin and down  to and including the subcutaneous tissue. The lesion was removed en bloc and submitted for perm pathologic review. hemostasis was achieved. The wound edges were then closed in a layered fashion, being careful not to leave any dead space. Postoperative length was 2 cm.   EBL minimal; complications none; wound care routine. The patient was discharged in good condition and will return in one week for wound evaluation.  It was a pleasure speaking to Vivian Carlin today.        Again, thank you for allowing me to participate in the care of your patient.        Sincerely,        Darrin Vernon MD

## 2021-05-13 NOTE — LETTER
[address]  [address]  [phone]    5/14/2021       Vivian Carlin  [address] [place] [address]      Dear Vivian:    You are scheduled for Mohs Surgery on: 5/20/21 @8:45am.    Please check in at [facility], [address].     You don't need to arrive more than 5-10 minutes prior to your appointment time.     Be sure to eat a good breakfast and bathe and wash your hair prior to surgery.     If you are taking any anti-coagulants that are prescribed by your Doctor (such as Coumadin/Warfarin, Plavix, Aspirin, Ibuprofen), please continue taking them.     However, if you are taking anti-coagulants over the counter without a Doctor's order for a medical condition, please discontinue them 10 days prior to surgery.     Please wear loose comfortable clothing as it could possibly be 4-6 hours until your surgery is completed depending upon how many layers of tissue need to be removed.      Thank you,    CAROL Vernon MD

## 2021-05-13 NOTE — PROGRESS NOTES
Vivian Carlin is an extremely pleasant 80 year old year old female patient here today for growthon left orbit and r cheek.   .   Patient states this has been present for a while.  Patient reports the following symptoms:  growing.  Patient reports the following previous treatments none.  These treatments did not work.  Patient reports the following modifying factors none.  Associated symptoms: none.  Patient has no other skin complaints today.  Remainder of the HPI, Meds, PMH, Allergies, FH, and SH was reviewed in chart.      Past Medical History:   Diagnosis Date     HTN (hypertension), benign     10 years     Hyperlipaemia     5 years     Nonsenile cataract      Strabismus        History reviewed. No pertinent surgical history.     Family History   Problem Relation Age of Onset     Squamous cell carcinoma Father        Social History     Socioeconomic History     Marital status:      Spouse name: Not on file     Number of children: Not on file     Years of education: Not on file     Highest education level: Not on file   Occupational History     Not on file   Social Needs     Financial resource strain: Not on file     Food insecurity     Worry: Not on file     Inability: Not on file     Transportation needs     Medical: Not on file     Non-medical: Not on file   Tobacco Use     Smoking status: Passive Smoke Exposure - Never Smoker     Smokeless tobacco: Never Used     Tobacco comment: years ago   Substance and Sexual Activity     Alcohol use: Yes     Comment: social      Drug use: No     Sexual activity: Not on file   Lifestyle     Physical activity     Days per week: Not on file     Minutes per session: Not on file     Stress: Not on file   Relationships     Social connections     Talks on phone: Not on file     Gets together: Not on file     Attends Restorationism service: Not on file     Active member of club or organization: Not on file     Attends meetings of clubs or organizations: Not on file      Relationship status: Not on file     Intimate partner violence     Fear of current or ex partner: Not on file     Emotionally abused: Not on file     Physically abused: Not on file     Forced sexual activity: Not on file   Other Topics Concern     Not on file   Social History Narrative     Not on file       Outpatient Encounter Medications as of 5/13/2021   Medication Sig Dispense Refill     aspirin 325 MG tablet Take  by mouth daily.       ATENOLOL PO Take 1 tablet by mouth once.       atorvastatin (LIPITOR) 10 MG tablet Take 10 mg by mouth daily.       Calcium Carbonate-Vitamin D (CALCIUM + D PO) Take  by mouth.       diltiazem ER COATED BEADS (CARDIZEM CD/CARTIA XT) 120 MG 24 hr capsule Take 1 capsule by mouth 2 times daily       hydrochlorothiazide (MICROZIDE) 12.5 MG capsule Take 12.5 mg by mouth daily.       IMMUNE GLOBULIN (HUMAN) IM INJ IM 1.5ml 0     LISINOPRIL PO Take 40 mg by mouth once        Multiple Vitamin (MULTI-VITAMIN DAILY) TABS Take  by mouth.       rivaroxaban ANTICOAGULANT (XARELTO) 15 MG TABS tablet Take 15 mg by mouth daily (with dinner)       No facility-administered encounter medications on file as of 5/13/2021.              O:   NAD, WDWN, Alert & Oriented, Mood & Affect wnl, Vitals stable   Here today family    BP (!) 175/85   Pulse 57   SpO2 98%    General appearance normal   Vitals stable   Alert, oriented and in no acute distress      Following lymph nodes palpated: Occipital, Cervical, Supraclavicular no lad   L medial orbit 5mm pink pearly papule   R meolabial fold 1cm scaly papule      Stuck on papules and brown macules on trunk and ext   Red papules on trunk  Flesh colored papules on trunk     The remainder of the full exam was normal; the following areas were examined:  conjunctiva/lids, oral mucosa, neck, peripheral vascular system, abdomen, lymph nodes, digits/nails, eccrine and apocrine glands, scalp/hair, face, neck, chest, abdomen, buttocks, back, RUE, LUE, RLE, LLE        Eyes: Conjunctivae/lids:Normal     ENT: Lips, buccal mucosa, tongue: normal    MSK:Normal    Cardiovascular: peripheral edema none    Pulm: Breathing Normal    Lymph Nodes: No Head and Neck Lymphadenopathy     Neuro/Psych: Orientation:Alert and Orientedx3 ; Mood/Affect:normal       MICRO:     L medial orbit(red):Orthokeratosis of epidermis with a proliferation of nests of basaloid cells, with peripheral palisading and a haphazard arrangement in the center extending into the dermis, forming nodules.  The tumor cells have hyperchromatic nuclei. Poor cytoplasm and intercellular bridging.    R meolabial fold (blue):There is hyperkeratosis & parakeratosis of the epidermis, with full thickness epidermal involvement by atypical keratinocytes with rare pale vacuolated cells.  Unremarkable dermis.    A/P:  1. Seborrheic keratosis, lentigo, angioma, dermal nevus  2. R/o basal cell carcinoma and squamous cell carcinoma   TANGENTIAL BIOPSY IN HOUSE:  After consent, anesthesia with LEC and prep, tangential excision performed and dx above confirmed with frozen section histology.  No complications and routine wound care.  Patient is on asa  anticoagulants and risk of bleeding discussed with patient.       I have personally reviewed all specimens and/or slides and used them with my medical judgement to determine or confirm the final diagnosis.     Patient told result   L medial orbit basal cell carcinoma   R meolabial fold squamous cell carcinoma in situ   Schedule excision .      It was a pleasure speaking to Vivian Carlin today.  Previous clinic notes and pertinent laboratory tests were reviewed prior to Vivian Raegan's visit.  The following medical tests were ordered and interpreted to assist in the evaluation and management of Vivian Carlin's issue.    Nature of benign skin lesions dicussed with patient.  Signs and Symptoms of skin cancer discussed with patient.  Patient encouraged to perform monthly skin exams.  UV  precautions reviewed with patient.  Risks of non-melanoma skin cancer discussed with patient   Return to clinic next appt

## 2021-05-13 NOTE — PATIENT INSTRUCTIONS
Wound Care Instructions     FOR SUPERFICIAL WOUNDS     [facility] [phone]    [facility] [phone]                       AFTER 24 HOURS YOU SHOULD REMOVE THE BANDAGE AND BEGIN DAILY DRESSING CHANGES AS FOLLOWS:     1) Remove Dressing.     2) Clean and dry the area with tap water using a Q-tip or sterile gauze pad.     3) Apply Vaseline, Aquaphor, Polysporin ointment or Bacitracin ointment over entire wound.  Do NOT use Neosporin ointment.     4) Cover the wound with a band-aid, or a sterile non-stick gauze pad and micropore paper tape      REPEAT THESE INSTRUCTIONS AT LEAST ONCE A DAY UNTIL THE WOUND HAS COMPLETELY HEALED.    It is an old wives tale that a wound heals better when it is exposed to air and allowed to dry out. The wound will heal faster with a better cosmetic result if it is kept moist with ointment and covered with a bandage.    **Do not let the wound dry out.**      Supplies Needed:      *Cotton tipped applicators (Q-tips)    *Polysporin Ointment or Bacitracin Ointment (NOT NEOSPORIN)    *Band-aids or non-stick gauze pads and micropore paper tape.      PATIENT INFORMATION:    During the healing process you will notice a number of changes. All wounds develop a small halo of redness surrounding the wound.  This means healing is occurring. Severe itching with extensive redness usually indicates sensitivity to the ointment or bandage tape used to dress the wound.  You should call our office if this develops.      Swelling  and/or discoloration around your surgical site is common, particularly when performed around the eye.    All wounds normally drain.  The larger the wound the more drainage there will be.  After 7-10 days, you will notice the wound beginning to shrink and new skin will begin to grow.  The wound is healed when you can see skin has formed over the entire area.  A healed wound has a healthy, shiny look to the surface and is red to dark pink in color  to normalize.  Wounds may take approximately 4-6 weeks to heal.  Larger wounds may take 6-8 weeks.  After the wound is healed you may discontinue dressing changes.    You may experience a sensation of tightness as your wound heals. This is normal and will gradually subside.    Your healed wound may be sensitive to temperature changes. This sensitivity improves with time, but if you re having a lot of discomfort, try to avoid temperature extremes.    Patients frequently experience itching after their wound appears to have healed because of the continue healing under the skin.  Plain Vaseline will help relieve the itching.        POSSIBLE COMPLICATIONS    BLEEDIN. Leave the bandage in place.  2. Use tightly rolled up gauze or a cloth to apply direct pressure over the bandage for 30  minutes.  3. Reapply pressure for an additional 30 minutes if necessary  4. Use additional gauze and tape to maintain pressure once the bleeding has stopped.

## 2021-05-14 ENCOUNTER — TELEPHONE (OUTPATIENT)
Dept: DERMATOLOGY | Facility: CLINIC | Age: 81
End: 2021-05-14

## 2021-05-14 NOTE — TELEPHONE ENCOUNTER
Duplicate- see other telephone encounter.    DARLIN Giraldo-BSN-N  [facility]  [phone]

## 2021-05-14 NOTE — TELEPHONE ENCOUNTER
Called and spoke to patient.     Educated patient on biopsy results- BCC (mohs) and SCIS (mohs/discuss alt tx options).    **Patient will discuss tx for SCIS (right meolabial fold) at appointment    Educated patient on BCC, mohs, scheduled mohs appointment, and letter/packet sent.    Patient voiced understanding.    DARLIN Giraldo-BSN-PHN  [facility]  [phone]

## 2021-05-14 NOTE — TELEPHONE ENCOUNTER
Reason for Call:  Request for results:    Name of test or procedure: Path results     Date of test of procedure: 05/13/21    Location of the test or procedure: OX     OK to leave the result message on voice mail or with a family member? NO    Phone number Patient can be reached at:  Home number on file [phone] (home)    Additional comments: Pt is requesting a call ASAP.     Call taken on 5/14/2021 at 11:24 AM by Gayathri Tinsley

## 2021-05-18 NOTE — PROGRESS NOTES
Surgical Office Location:  [facility]  [address] [place] [id number]

## 2021-05-20 ENCOUNTER — OFFICE VISIT (OUTPATIENT)
Dept: DERMATOLOGY | Facility: CLINIC | Age: 81
End: 2021-05-20
Payer: MEDICARE

## 2021-05-20 VITALS — DIASTOLIC BLOOD PRESSURE: 81 MMHG | SYSTOLIC BLOOD PRESSURE: 159 MMHG | OXYGEN SATURATION: 100 % | HEART RATE: 49 BPM

## 2021-05-20 DIAGNOSIS — C44.111 BASAL CELL CARCINOMA (BCC) OF LEFT ORBIT: Primary | ICD-10-CM

## 2021-05-20 PROCEDURE — 99207 PR NO CHARGE LOS: CPT | Performed by: DERMATOLOGY

## 2021-05-20 PROCEDURE — 13152 CMPLX RPR E/N/E/L 2.6-7.5 CM: CPT | Performed by: DERMATOLOGY

## 2021-05-20 PROCEDURE — 17311 MOHS 1 STAGE H/N/HF/G: CPT | Performed by: DERMATOLOGY

## 2021-05-20 NOTE — PATIENT INSTRUCTIONS
Sutured Wound Care     [facility]: [phone]    [facility]: [phone]          ? No strenuous activity for 48 hours. Resume moderate activity in 48 hours. No heavy exercising until you are seen for follow up in one week.     ? Take Tylenol as needed for discomfort.                         ? Do not drink alcoholic beverages for 48 hours.     ? Keep the pressure bandage in place for 24 hours. If the bandage becomes blood tinged or loose, reinforce it with gauze and tape.        (Refer to the reverse side of this page for management of bleeding).    ? Remove pressure bandage in 24 hours     ? Leave the flat bandage in place until your follow up appointment.    ? Keep the bandage dry. Wash around it carefully.    ? If the tape becomes soiled or starts to come off, reinforce it with additional paper tape.    ? Do not smoke for 3 weeks; smoking is detrimental to wound healing.    ? It is normal to have swelling and bruising around the surgical site. The bruising will fade in approximately 10-14 days. Elevate the area to reduce swelling.    ? Numbness, itchiness and sensitivity to temperature changes can occur after surgery and may take up to 18 months to normalize.                    POSSIBLE COMPLICATIONS    BLEEDIN. Leave the bandage in place.Use tightly rolled up gauze or a cloth to apply direct pressure over the bandage for 20 minutes.  2. Reapply pressure for an additional 20 minutes if necessary  3. Call the office or go to the nearest emergency room if pressure fails to stop the bleeding.  4. Use additional gauze and tape to maintain pressure once the bleeding has stopped.        PAIN:    1. Post operative pain should slowly get better, never worse.  2. A severe increase in pain may indicate a problem. Call the office if this occurs.    In case of emergency phone:Dr Vernon [phone]

## 2021-05-20 NOTE — LETTER
5/20/2021         RE: Vivian Carlin  [address] [place] [address]        Dear Colleague,    Thank you for referring your patient, Vivian Carlin, to the [facility]. Please see a copy of my visit note below.    Surgical Office Location:  [facility]  [address] [place] [id number]      Vivian aCrlin is an extremely pleasant 80 year old year old female patient here today for evaluation and managment of basal cell carcinoma on left medial orbit. Patient has no other skin complaints today.  Remainder of the HPI, Meds, PMH, Allergies, FH, and SH was reviewed in chart.      Past Medical History:   Diagnosis Date     Basal cell carcinoma      HTN (hypertension), benign     10 years     Hyperlipaemia     5 years     Nonsenile cataract      Squamous cell carcinoma of skin, unspecified      Strabismus        No past surgical history on file.     Family History   Problem Relation Age of Onset     Squamous cell carcinoma Father        Social History     Socioeconomic History     Marital status:      Spouse name: Not on file     Number of children: Not on file     Years of education: Not on file     Highest education level: Not on file   Occupational History     Not on file   Social Needs     Financial resource strain: Not on file     Food insecurity     Worry: Not on file     Inability: Not on file     Transportation needs     Medical: Not on file     Non-medical: Not on file   Tobacco Use     Smoking status: Passive Smoke Exposure - Never Smoker     Smokeless tobacco: Never Used     Tobacco comment: years ago   Substance and Sexual Activity     Alcohol use: Yes     Comment: social      Drug use: No     Sexual activity: Not on file   Lifestyle     Physical activity     Days per week: Not on file     Minutes per session: Not on file     Stress: Not on file   Relationships     Social connections     Talks on phone: Not on file     Gets together:  Not on file     Attends Congregational service: Not on file     Active member of club or organization: Not on file     Attends meetings of clubs or organizations: Not on file     Relationship status: Not on file     Intimate partner violence     Fear of current or ex partner: Not on file     Emotionally abused: Not on file     Physically abused: Not on file     Forced sexual activity: Not on file   Other Topics Concern     Not on file   Social History Narrative     Not on file       Outpatient Encounter Medications as of 5/20/2021   Medication Sig Dispense Refill     aspirin 325 MG tablet Take  by mouth daily.       ATENOLOL PO Take 1 tablet by mouth once.       atorvastatin (LIPITOR) 10 MG tablet Take 10 mg by mouth daily.       Calcium Carbonate-Vitamin D (CALCIUM + D PO) Take  by mouth.       diltiazem ER COATED BEADS (CARDIZEM CD/CARTIA XT) 120 MG 24 hr capsule Take 1 capsule by mouth 2 times daily       hydrochlorothiazide (MICROZIDE) 12.5 MG capsule Take 12.5 mg by mouth daily.       IMMUNE GLOBULIN (HUMAN) IM INJ IM 1.5ml 0     LISINOPRIL PO Take 40 mg by mouth once        Multiple Vitamin (MULTI-VITAMIN DAILY) TABS Take  by mouth.       rivaroxaban ANTICOAGULANT (XARELTO) 15 MG TABS tablet Take 15 mg by mouth daily (with dinner)       No facility-administered encounter medications on file as of 5/20/2021.              O:   NAD, WDWN, Alert & Oriented, Mood & Affect wnl, Vitals stable   Here today daughter    BP (!) 159/81   Pulse (!) 49   SpO2 100%    General appearance normal   Vitals stable   Alert, oriented and in no acute distress     L  Medial orbit 5mm scaly papule       Eyes: Conjunctivae/lids:Normal     ENT: Lips, buccal mucosa, tongue: normal    MSK:Normal    Cardiovascular: peripheral edema none    Pulm: Breathing Normal    Neuro/Psych: Orientation:Alert and Orientedx3 ; Mood/Affect:normal       A/P:  1. L medial orbit basal cell carcinoma   MOHS:   Location    The rationale for Mohs surgery was  discussed with the patient and consent was obtained.  The risks and benefits as well as alternatives to therapy were discussed, in detail.  Specifically, the risks of infection, scarring, bleeding, prolonged wound healing, incomplete removal, allergy to anesthesia, nerve injury and recurrence were addressed.  Indication for Mohs was Location. Prior to the procedure, the treatment site was clearly identified and, if available, confirmed with previous photos and confirmed by the patient   All components of the Universal Protocol/PAUSE rule were completed.  The Mohs surgeon operated in two distinct and integrated capacities as the surgeon and pathologist.      The area was prepped with Betasept.  A rim of normal appearing skin was marked circumferentially around the lesion.  The area was infiltrated with local anesthesia.  The tumor was first debulked to remove all clinically apparent tumor.  An incision following the standard Mohs approach was done and the specimen was oriented,mapped and placed in 1 block(s).  Each specimen was then chromacoded and processed in the Mohs laboratory using standard Mohs technique and submitted for frozen section histology.  Frozen section analysis showed no residual tumor but CLEAR MARGINS.      The tumor was excised using standard Mohs technique in 1 stages(s).  CLEAR MARGINS OBTAINED and Final defect size was 0.9 x 1.1 cm.     We discussed the options for wound management in full with the patient including risks/benefits/ possible outcomes.        REPAIR COMPLEX: Because of the tightness of the surrounding skin and Because of the size and full thickness nature of the defect, a complex closure was planned. After LEC anesthesia and prep, Burow's triangles were excised in the relaxed skin tension lines. The wound edges were widely undermined greater than width of the defect on both sides (1.1 cm) by dissection in the subcutaneous plane until adequate tissue mobility was obtained.  Hemostasis was obtained. The wound edges were closed in a layered fashion using Vicryl and Fast Absorbing Plain Gut sutures. Postoperative length was 2.9 cm.   EBL minimal; complications none; wound care routine.  The patient was discharged in good condition and will return in one week for wound evaluation.  Vivian to follow up with Primary Care provider regarding elevated blood pressure.  It was a pleasure speaking to Vivian Carlin today.  Previous clinic notes and pertinent laboratory tests were reviewed prior to Vivian Carlin's visit.  Signs and Symptoms of skin cancer discussed with patient.  Patient encouraged to perform monthly skin exams.  UV precautions reviewed with patient.  Risks of non-melanoma skin cancer discussed with patient   Return to clinic next appt        Again, thank you for allowing me to participate in the care of your patient.        Sincerely,        Darrin Vernon MD

## 2021-05-20 NOTE — NURSING NOTE
Initial BP (!) 159/81   Pulse (!) 49   SpO2 100%  There is no height or weight on file to calculate BMI. .    DARLIN Giraldo-BSN-PHN  [facility]  [phone]

## 2021-05-20 NOTE — PROGRESS NOTES
Vivian Carlin is an extremely pleasant 80 year old year old female patient here today for evaluation and managment of basal cell carcinoma on left medial orbit. Patient has no other skin complaints today.  Remainder of the HPI, Meds, PMH, Allergies, FH, and SH was reviewed in chart.      Past Medical History:   Diagnosis Date     Basal cell carcinoma      HTN (hypertension), benign     10 years     Hyperlipaemia     5 years     Nonsenile cataract      Squamous cell carcinoma of skin, unspecified      Strabismus        No past surgical history on file.     Family History   Problem Relation Age of Onset     Squamous cell carcinoma Father        Social History     Socioeconomic History     Marital status:      Spouse name: Not on file     Number of children: Not on file     Years of education: Not on file     Highest education level: Not on file   Occupational History     Not on file   Social Needs     Financial resource strain: Not on file     Food insecurity     Worry: Not on file     Inability: Not on file     Transportation needs     Medical: Not on file     Non-medical: Not on file   Tobacco Use     Smoking status: Passive Smoke Exposure - Never Smoker     Smokeless tobacco: Never Used     Tobacco comment: years ago   Substance and Sexual Activity     Alcohol use: Yes     Comment: social      Drug use: No     Sexual activity: Not on file   Lifestyle     Physical activity     Days per week: Not on file     Minutes per session: Not on file     Stress: Not on file   Relationships     Social connections     Talks on phone: Not on file     Gets together: Not on file     Attends Restorationist service: Not on file     Active member of club or organization: Not on file     Attends meetings of clubs or organizations: Not on file     Relationship status: Not on file     Intimate partner violence     Fear of current or ex partner: Not on file     Emotionally abused: Not on file     Physically abused: Not on file      Forced sexual activity: Not on file   Other Topics Concern     Not on file   Social History Narrative     Not on file       Outpatient Encounter Medications as of 5/20/2021   Medication Sig Dispense Refill     aspirin 325 MG tablet Take  by mouth daily.       ATENOLOL PO Take 1 tablet by mouth once.       atorvastatin (LIPITOR) 10 MG tablet Take 10 mg by mouth daily.       Calcium Carbonate-Vitamin D (CALCIUM + D PO) Take  by mouth.       diltiazem ER COATED BEADS (CARDIZEM CD/CARTIA XT) 120 MG 24 hr capsule Take 1 capsule by mouth 2 times daily       hydrochlorothiazide (MICROZIDE) 12.5 MG capsule Take 12.5 mg by mouth daily.       IMMUNE GLOBULIN (HUMAN) IM INJ IM 1.5ml 0     LISINOPRIL PO Take 40 mg by mouth once        Multiple Vitamin (MULTI-VITAMIN DAILY) TABS Take  by mouth.       rivaroxaban ANTICOAGULANT (XARELTO) 15 MG TABS tablet Take 15 mg by mouth daily (with dinner)       No facility-administered encounter medications on file as of 5/20/2021.              O:   NAD, WDWN, Alert & Oriented, Mood & Affect wnl, Vitals stable   Here today daughter    BP (!) 159/81   Pulse (!) 49   SpO2 100%    General appearance normal   Vitals stable   Alert, oriented and in no acute distress     L  Medial orbit 5mm scaly papule       Eyes: Conjunctivae/lids:Normal     ENT: Lips, buccal mucosa, tongue: normal    MSK:Normal    Cardiovascular: peripheral edema none    Pulm: Breathing Normal    Neuro/Psych: Orientation:Alert and Orientedx3 ; Mood/Affect:normal       A/P:  1. L medial orbit basal cell carcinoma   MOHS:   Location    The rationale for Mohs surgery was discussed with the patient and consent was obtained.  The risks and benefits as well as alternatives to therapy were discussed, in detail.  Specifically, the risks of infection, scarring, bleeding, prolonged wound healing, incomplete removal, allergy to anesthesia, nerve injury and recurrence were addressed.  Indication for Mohs was Location. Prior to the  procedure, the treatment site was clearly identified and, if available, confirmed with previous photos and confirmed by the patient   All components of the Universal Protocol/PAUSE rule were completed.  The Mohs surgeon operated in two distinct and integrated capacities as the surgeon and pathologist.      The area was prepped with Betasept.  A rim of normal appearing skin was marked circumferentially around the lesion.  The area was infiltrated with local anesthesia.  The tumor was first debulked to remove all clinically apparent tumor.  An incision following the standard Mohs approach was done and the specimen was oriented,mapped and placed in 1 block(s).  Each specimen was then chromacoded and processed in the Mohs laboratory using standard Mohs technique and submitted for frozen section histology.  Frozen section analysis showed no residual tumor but CLEAR MARGINS.      The tumor was excised using standard Mohs technique in 1 stages(s).  CLEAR MARGINS OBTAINED and Final defect size was 0.9 x 1.1 cm.     We discussed the options for wound management in full with the patient including risks/benefits/ possible outcomes.        REPAIR COMPLEX: Because of the tightness of the surrounding skin and Because of the size and full thickness nature of the defect, a complex closure was planned. After LEC anesthesia and prep, Burow's triangles were excised in the relaxed skin tension lines. The wound edges were widely undermined greater than width of the defect on both sides (1.1 cm) by dissection in the subcutaneous plane until adequate tissue mobility was obtained. Hemostasis was obtained. The wound edges were closed in a layered fashion using Vicryl and Fast Absorbing Plain Gut sutures. Postoperative length was 2.9 cm.   EBL minimal; complications none; wound care routine.  The patient was discharged in good condition and [place] Care provider regarding  elevated blood pressure.  It was a pleasure speaking to Vivian Carlin today.  Previous clinic notes and pertinent laboratory tests were reviewed prior to Vivian Carlin's visit.  Signs and Symptoms of skin cancer discussed with patient.  Patient encouraged to perform monthly skin exams.  UV precautions reviewed with patient.  Risks of non-melanoma skin cancer discussed with patient   Return to clinic next appt

## 2021-05-21 ENCOUNTER — TELEPHONE (OUTPATIENT)
Dept: DERMATOLOGY | Facility: CLINIC | Age: 81
End: 2021-05-21

## 2021-05-21 NOTE — TELEPHONE ENCOUNTER
Patient called clinic.    Scheduled following week mohs appointment (SCIS, right melolabial fold) w/ bandage change from 5/20 mohs (left medial orbit).    Patient voiced understanding    DARLIN Giraldo-BSN-PHN  [facility]  [phone]

## 2021-05-27 ENCOUNTER — OFFICE VISIT (OUTPATIENT)
Dept: DERMATOLOGY | Facility: CLINIC | Age: 81
End: 2021-05-27
Payer: MEDICARE

## 2021-05-27 VITALS — OXYGEN SATURATION: 100 % | HEART RATE: 53 BPM | SYSTOLIC BLOOD PRESSURE: 165 MMHG | DIASTOLIC BLOOD PRESSURE: 90 MMHG

## 2021-05-27 DIAGNOSIS — D04.39 SQUAMOUS CELL CARCINOMA IN SITU (SCCIS) OF SKIN OF RIGHT CHEEK: Primary | ICD-10-CM

## 2021-05-27 PROCEDURE — 17311 MOHS 1 STAGE H/N/HF/G: CPT | Mod: 79 | Performed by: DERMATOLOGY

## 2021-05-27 PROCEDURE — 99207 PR NO CHARGE LOS: CPT | Performed by: DERMATOLOGY

## 2021-05-27 PROCEDURE — 12041 INTMD RPR N-HF/GENIT 2.5CM/<: CPT | Mod: 79 | Performed by: DERMATOLOGY

## 2021-05-27 NOTE — LETTER
5/27/2021         RE: Vivian Carlin  [address] [place] [address]        Dear Colleague,    Thank you for referring your patient, Vivian Carlin, to the [facility]. Please see a copy of my visit note below.    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 . Patient has no other skin complaints today.  Remainder of the HPI, Meds, PMH, Allergies, FH, and SH was reviewed in chart.      Past Medical History:   Diagnosis Date     Basal cell carcinoma      HTN (hypertension), benign     10 years     Hyperlipaemia     5 years     Nonsenile cataract      Squamous cell carcinoma of skin, unspecified      Strabismus        No past surgical history on file.     Family History   Problem Relation Age of Onset     Squamous cell carcinoma Father        Social History     Socioeconomic History     Marital status:      Spouse name: Not on file     Number of children: Not on file     Years of education: Not on file     Highest education level: Not on file   Occupational History     Not on file   Social Needs     Financial resource strain: Not on file     Food insecurity     Worry: Not on file     Inability: Not on file     Transportation needs     Medical: Not on file     Non-medical: Not on file   Tobacco Use     Smoking status: Passive Smoke Exposure - Never Smoker     Smokeless tobacco: Never Used     Tobacco comment: years ago   Substance and Sexual Activity     Alcohol use: Yes     Comment: social      Drug use: No     Sexual activity: Not on file   Lifestyle     Physical activity     Days per week: Not on file     Minutes per session: Not on file     Stress: Not on file    Relationships     Social connections     Talks on phone: Not on file     Gets together: Not on file     Attends Adventism service: Not on file     Active member of club or organization: Not on file     Attends meetings of clubs or organizations: Not on file     Relationship status: Not on file     Intimate partner violence     Fear of current or ex partner: Not on file     Emotionally abused: Not on file     Physically abused: Not on file     Forced sexual activity: Not on file   Other Topics Concern     Not on file   Social History Narrative     Not on file       Outpatient Encounter Medications as of 5/27/2021   Medication Sig Dispense Refill     aspirin 325 MG tablet Take  by mouth daily.       ATENOLOL PO Take 1 tablet by mouth once.       atorvastatin (LIPITOR) 10 MG tablet Take 10 mg by mouth daily.       Calcium Carbonate-Vitamin D (CALCIUM + D PO) Take  by mouth.       diltiazem ER COATED BEADS (CARDIZEM CD/CARTIA XT) 120 MG 24 hr capsule Take 1 capsule by mouth 2 times daily       hydrochlorothiazide (MICROZIDE) 12.5 MG capsule Take 12.5 mg by mouth daily.       IMMUNE GLOBULIN (HUMAN) IM INJ IM 1.5ml 0     LISINOPRIL PO Take 40 mg by mouth once        Multiple Vitamin (MULTI-VITAMIN DAILY) TABS Take  by mouth.       rivaroxaban ANTICOAGULANT (XARELTO) 15 MG TABS tablet Take 15 mg by mouth daily (with dinner)       No facility-administered encounter medications on file as of 5/27/2021.              O:   NAD, WDWN, Alert & Oriented, Mood & Affect wnl, Vitals stable   Here today alone   BP (!) 165/90   Pulse 53   SpO2 100%    General appearance normal   Vitals stable   Alert, oriented and in no acute distress     R meolabial fold 1cm scaly papule   L medial canthus small area of dehiscence      Eyes: Conjunctivae/lids:Normal     ENT: Lips, buccal mucosa, tongue: normal    MSK:Normal    Cardiovascular: peripheral edema none    Pulm: Breathing Normal    Neuro/Psych: Orientation:Alert and Orientedx3 ;  Mood/Affect:normal       A/P:  1. L medial canthus hx of non-melanoma skin cancer   Dehiscence single interupted suture placed  Wound dressed  2. 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  The risks and benefits as well as alternatives to therapy were discussed, in detail.  Specifically, the risks of infection, scarring, bleeding, prolonged wound healing, incomplete removal, allergy to anesthesia, nerve injury and recurrence were addressed.  Indication for Mohs was Location. Prior to the procedure, the treatment site was clearly identified and, if available, confirmed with previous photos and confirmed by the patient   All components of the Universal Protocol/PAUSE rule were completed.  The Mohs surgeon operated in two distinct and integrated capacities as the surgeon and pathologist.      The area was prepped with Betasept.  A rim of normal appearing skin was marked circumferentially around the lesion.  The area was infiltrated with local anesthesia.  The tumor was first debulked to remove all clinically apparent tumor.  An incision following the standard Mohs approach was done and the specimen was oriented,mapped and placed in 1 block(s).  Each specimen was then chromacoded and processed in the Mohs laboratory using standard Mohs technique and submitted for frozen section histology.  Frozen section analysis showed residual tumor but CLEAR MARGINS.      First stage focus of There is hyperkeratosis & parakeratosis of the epidermis, with full thickness epidermal involvement by atypical keratinocytes with rare pale vacuolated cells.  Unremarkable dermis.      The tumor was excised using standard Mohs technique in 1 stages(s).  CLEAR MARGINS OBTAINED and Final defect size was 1.4* cm.     We discussed the options for wound management in full with the patient including risks/benefits/ possible outcomes.         REPAIR INT: Because of the size and full thickness nature of the defect, a intermediate closure was planned. After LEC anesthesia and prep, Burow's triangles were excised in the relaxed skin tension lines. The wound edges were undermined by dissection in the subcutaneous plane until adequate tissue mobility was obtained. Hemostasis was obtained. The wound edges were closed in a layered fashion using Vicryl and Fast Absorbing Plain Gut sutures. Postoperative length was 2 cm.   EBL minimal; complications none; wound care routine.  The patient was discharged in good condition and will return in one week for wound evaluation.  It was a pleasure speaking to Vivian Carlin today.  Previous clinic notes and pertinent laboratory tests were reviewed prior to Vivian Carlin's visit.  Signs and Symptoms of skin cancer discussed with patient.  Patient encouraged to perform monthly skin exams.  UV precautions reviewed with patient.  Risks of non-melanoma skin cancer discussed with patient   Return to clinic 1 WEEKS      Again, thank you for allowing me to participate in the care of your patient.        Sincerely,        Darrin Vernon MD

## 2021-05-27 NOTE — NURSING NOTE
Initial BP (!) 165/90   Pulse 53   SpO2 100%  There is no height or weight on file to calculate BMI. .    DARLIN Giraldo-BSN-PHN  [facility]  [phone]

## 2021-05-27 NOTE — PATIENT INSTRUCTIONS
WOUND CARE INSTRUCTIONS  for  ONE WEEK AFTER SURGERY        left medial orbit          1) Leave flat bandage on your skin for one week after today s bandage change.  2) In one week when you remove the bandage, you may resume your regular skin care routine, including washing with mild soap and water, applying moisturizer, make-up and sunscreen.    3) If there are any open or bleeding areas at the incision/graft site you should begin to cover the area with a bandage daily as follows:    1) Clean and dry the area with plain tap water using a Q-tip or sterile gauze pad.  2) Apply Polysporin or Bacitracin ointment to the open area.  3) Cover the wound with a band-aid or a sterile non-stick gauze pad and micropore paper tape.         SIGNS OF INFECTION  - If you notice any of these signs of infection, call your doctor right away: expanding redness around the wound.  - Yellow or greenish-colored pus or cloudy wound drainage.    - Red streaking spreading from the wound.  - Increased swelling, tenderness, or pain around the wound.   - Fever.    Please remember that yellow and clear drainage from a wound can be normal and related to normal wound healing.  Isolated drainage from a wound without a combination of the above features does not indicate infection.       *Once the bandages are removed, the scar will be red and firm (especially in the lip/chin area). This is normal and will fade in time. It might take 6-12 months for this to happen.     *Massaging the area will help the scar soften and fade quicker. Begin to massage the area one month after the bandages have been removed. To massage apply pressure directly and firmly over the scar with the fingertips and move in a circular motion. Massage the area for a few minutes several times a day. Continue to massage the site for several months.    *Approximately 6-8 weeks after surgery it is not uncommon to see the formation of  tender pimple-like  bump along the scar. This is  normal. As the scar continues to mature and the stitches underneath the skin begin to dissolve, this might occur. Do not pick or squeeze, this will resolve on it s own. Should one break open producing a small amount of drainage, apply Polysporin or Bacitracin ointment a few times a day until the wound is completely healed.    *Numbness in the surgical area is expected. It might take 12-18 months for the feeling to return to normal. During this time sensations of itchiness, tingling and occasional sharp pains might be noted. These feelings are normal and will subside once the nerves have completely healed.         IN CASE OF EMERGENCY: Dr Vernon [phone]     If you were seen in [place] call: [phone]      Sutured Wound Care     [place]: [phone]    right melolabial fold          ? No strenuous activity for 48 hours. Resume moderate activity in 48 hours. No heavy exercising until you are seen for follow up in one week.     ? Take Tylenol as needed for discomfort.                         ? Do not drink alcoholic beverages for 48 hours.     ? Keep the pressure bandage in place for 24 hours. If the bandage becomes blood tinged or loose, reinforce it with gauze and tape.        (Refer to the reverse side of this page for management of bleeding).    ? Remove pressure bandage in 24 hours     ? Leave the flat bandage in place until your follow up appointment.    ? Keep the bandage dry. Wash around it carefully.    ? If the tape becomes soiled or starts to come off, reinforce it with additional paper tape.    ? Do not smoke for 3 weeks; smoking is detrimental to wound healing.    ? It is normal to have swelling and bruising around the surgical site. The bruising will fade in approximately 10-14 days. Elevate the area to reduce swelling.    ? Numbness, itchiness and sensitivity to temperature changes can occur after surgery and may take up to 18 months to normalize.      POSSIBLE  COMPLICATIONS    BLEEDIN. Leave the bandage in place.  2. Use tightly rolled up gauze or a cloth to apply direct pressure over the bandage for 20   minutes.  3. Reapply pressure for an additional 20 minutes if necessary  4. Call the office or go to the nearest emergency room if pressure fails to stop the bleeding.  5. Use additional gauze and tape to maintain pressure once the bleeding has stopped.        PAIN:    1. Post operative pain should slowly get better, never worse.  2. A severe increase in pain may indicate a problem. Call the office if this occurs.    In case of emergency phone:Dr Vernon [phone]

## 2021-05-27 NOTE — PROGRESS NOTES
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 . Patient has no other skin complaints today.  Remainder of the HPI, Meds, PMH, Allergies, FH, and SH was reviewed in chart.      Past Medical History:   Diagnosis Date     Basal cell carcinoma      HTN (hypertension), benign     10 years     Hyperlipaemia     5 years     Nonsenile cataract      Squamous cell carcinoma of skin, unspecified      Strabismus        No past surgical history on file.     Family History   Problem Relation Age of Onset     Squamous cell carcinoma Father        Social History     Socioeconomic History     Marital status:      Spouse name: Not on file     Number of children: Not on file     Years of education: Not on file     Highest education level: Not on file   Occupational History     Not on file   Social Needs     Financial resource strain: Not on file     Food insecurity     Worry: Not on file     Inability: Not on file     Transportation needs     Medical: Not on file     Non-medical: Not on file   Tobacco Use     Smoking status: Passive Smoke Exposure - Never Smoker     Smokeless tobacco: Never Used     Tobacco comment: years ago   Substance and Sexual Activity     Alcohol use: Yes     Comment: social      Drug use: No     Sexual activity: Not on file   Lifestyle     Physical activity     Days per week: Not on file     Minutes per session: Not on file     Stress: Not on file   Relationships     Social connections     Talks on phone: Not on file     Gets together: Not on file     Attends Mandaen service: Not on file     Active member of club or organization: Not on file     Attends meetings of clubs or organizations: Not on file     Relationship status: Not on file     Intimate partner violence     Fear of current or ex partner: Not on file      Emotionally abused: Not on file     Physically abused: Not on file     Forced sexual activity: Not on file   Other Topics Concern     Not on file   Social History Narrative     Not on file       Outpatient Encounter Medications as of 5/27/2021   Medication Sig Dispense Refill     aspirin 325 MG tablet Take  by mouth daily.       ATENOLOL PO Take 1 tablet by mouth once.       atorvastatin (LIPITOR) 10 MG tablet Take 10 mg by mouth daily.       Calcium Carbonate-Vitamin D (CALCIUM + D PO) Take  by mouth.       diltiazem ER COATED BEADS (CARDIZEM CD/CARTIA XT) 120 MG 24 hr capsule Take 1 capsule by mouth 2 times daily       hydrochlorothiazide (MICROZIDE) 12.5 MG capsule Take 12.5 mg by mouth daily.       IMMUNE GLOBULIN (HUMAN) IM INJ IM 1.5ml 0     LISINOPRIL PO Take 40 mg by mouth once        Multiple Vitamin (MULTI-VITAMIN DAILY) TABS Take  by mouth.       rivaroxaban ANTICOAGULANT (XARELTO) 15 MG TABS tablet Take 15 mg by mouth daily (with dinner)       No facility-administered encounter medications on file as of 5/27/2021.              O:   NAD, WDWN, Alert & Oriented, Mood & Affect wnl, Vitals stable   Here today alone   BP (!) 165/90   Pulse 53   SpO2 100%    General appearance normal   Vitals stable   Alert, oriented and in no acute distress     R meolabial fold 1cm scaly papule   L medial canthus small area of dehiscence      Eyes: Conjunctivae/lids:Normal     ENT: Lips, buccal mucosa, tongue: normal    MSK:Normal    Cardiovascular: peripheral edema none    Pulm: Breathing Normal    Neuro/Psych: Orientation:Alert and Orientedx3 ; Mood/Affect:normal       A/P:  1. L medial canthus hx of non-melanoma skin cancer   Dehiscence single interupted suture placed  Wound dressed  2. 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  The risks and benefits as well as alternatives to therapy were discussed, in  detail.  Specifically, the risks of infection, scarring, bleeding, prolonged wound healing, incomplete removal, allergy to anesthesia, nerve injury and recurrence were addressed.  Indication for Mohs was Location. Prior to the procedure, the treatment site was clearly identified and, if available, confirmed with previous photos and confirmed by the patient   All components of the Universal Protocol/PAUSE rule were completed.  The Mohs surgeon operated in two distinct and integrated capacities as the surgeon and pathologist.      The area was prepped with Betasept.  A rim of normal appearing skin was marked circumferentially around the lesion.  The area was infiltrated with local anesthesia.  The tumor was first debulked to remove all clinically apparent tumor.  An incision following the standard Mohs approach was done and the specimen was oriented,mapped and placed in 1 block(s).  Each specimen was then chromacoded and processed in the Mohs laboratory using standard Mohs technique and submitted for frozen section histology.  Frozen section analysis showed residual tumor but CLEAR MARGINS.      First stage focus of There is hyperkeratosis & parakeratosis of the epidermis, with full thickness epidermal involvement by atypical keratinocytes with rare pale vacuolated cells.  Unremarkable dermis.      The tumor was excised using standard Mohs technique in 1 stages(s).  CLEAR MARGINS OBTAINED and Final defect size was 1.4* cm.     We discussed the options for wound management in full with the patient including risks/benefits/ possible outcomes.        REPAIR INT: Because of the size and full thickness nature of the defect, a intermediate closure was planned. After LEC anesthesia and prep, Burow's triangles were excised in the relaxed skin tension lines. The wound edges were undermined by dissection in the subcutaneous plane until adequate tissue mobility was obtained. Hemostasis was obtained. The wound edges were closed  in a layered fashion using Vicryl and Fast Absorbing Plain Gut sutures. Postoperative length was 2 cm.   EBL minimal; complications none; wound care routine.  The patient was discharged in good condition and will return in one week for wound evaluation.  It was a pleasure speaking to Vivian Carlin today.  Previous clinic notes and pertinent laboratory tests were reviewed prior to Vivian Carlin's visit.  Signs and Symptoms of skin cancer discussed with patient.  Patient encouraged to perform monthly skin exams.  UV precautions reviewed with patient.  Risks of non-melanoma skin cancer discussed with patient   Return to clinic 1 WEEKS

## 2023-09-26 ENCOUNTER — OFFICE VISIT (OUTPATIENT)
Dept: OPHTHALMOLOGY | Facility: CLINIC | Age: 83
End: 2023-09-26
Payer: MEDICARE

## 2023-09-26 DIAGNOSIS — Z01.01 ENCOUNTER FOR EXAMINATION OF EYES AND VISION WITH ABNORMAL FINDINGS: ICD-10-CM

## 2023-09-26 DIAGNOSIS — H25.813 COMBINED FORMS OF AGE-RELATED CATARACT OF BOTH EYES: Primary | ICD-10-CM

## 2023-09-26 DIAGNOSIS — H49.11 SUPERIOR OBLIQUE PALSY, RIGHT: ICD-10-CM

## 2023-09-26 DIAGNOSIS — H02.834 DERMATOCHALASIS OF BOTH UPPER EYELIDS: ICD-10-CM

## 2023-09-26 DIAGNOSIS — H43.813 POSTERIOR VITREOUS DETACHMENT OF BOTH EYES: ICD-10-CM

## 2023-09-26 DIAGNOSIS — H02.831 DERMATOCHALASIS OF BOTH UPPER EYELIDS: ICD-10-CM

## 2023-09-26 DIAGNOSIS — H52.4 PRESBYOPIA: ICD-10-CM

## 2023-09-26 PROBLEM — N25.81 HYPERPARATHYROIDISM DUE TO RENAL INSUFFICIENCY (H): Chronic | Status: ACTIVE | Noted: 2022-11-21

## 2023-09-26 PROBLEM — D68.9 COAGULOPATHY (H): Status: ACTIVE | Noted: 2021-07-09

## 2023-09-26 PROBLEM — I27.82 CHRONIC PULMONARY EMBOLISM (H): Status: ACTIVE | Noted: 2022-07-11

## 2023-09-26 PROBLEM — N28.0 RENAL INFARCTION (H): Status: ACTIVE | Noted: 2019-08-20

## 2023-09-26 PROBLEM — N18.31 STAGE 3A CHRONIC KIDNEY DISEASE (H): Status: ACTIVE | Noted: 2022-07-11

## 2023-09-26 PROBLEM — C02.9 MALIGNANT NEOPLASM OF TONGUE (H): Status: ACTIVE | Noted: 2017-09-07

## 2023-09-26 PROCEDURE — 92014 COMPRE OPH EXAM EST PT 1/>: CPT | Performed by: OPHTHALMOLOGY

## 2023-09-26 PROCEDURE — 92015 DETERMINE REFRACTIVE STATE: CPT | Mod: GY | Performed by: OPHTHALMOLOGY

## 2023-09-26 RX ORDER — TORSEMIDE 5 MG/1
1 TABLET ORAL DAILY
COMMUNITY
Start: 2022-07-11

## 2023-09-26 RX ORDER — POTASSIUM CHLORIDE 1500 MG/1
TABLET, EXTENDED RELEASE ORAL
COMMUNITY
Start: 2023-05-31

## 2023-09-26 ASSESSMENT — REFRACTION_WEARINGRX
OD_ADD: +3.00
OS_CYLINDER: +1.00
OS_AXIS: 178
SPECS_TYPE: PAL
OD_AXIS: 016
OS_SPHERE: +3.00
OD_CYLINDER: +2.00
OS_ADD: +3.00
OD_SPHERE: +2.00

## 2023-09-26 ASSESSMENT — CONF VISUAL FIELD
OD_NORMAL: 1
OS_INFERIOR_TEMPORAL_RESTRICTION: 0
OS_INFERIOR_NASAL_RESTRICTION: 0
OS_SUPERIOR_NASAL_RESTRICTION: 0
OD_INFERIOR_TEMPORAL_RESTRICTION: 0
OS_NORMAL: 1
OD_INFERIOR_NASAL_RESTRICTION: 0
OD_SUPERIOR_NASAL_RESTRICTION: 0
OD_SUPERIOR_TEMPORAL_RESTRICTION: 0
OS_SUPERIOR_TEMPORAL_RESTRICTION: 0

## 2023-09-26 ASSESSMENT — VISUAL ACUITY
OD_CC+: +1
OD_PH_CC+: -1
OS_CC: J1
OD_CC: 20/50
OD_CC: J1
METHOD: SNELLEN - LINEAR
OS_CC: 20/40
CORRECTION_TYPE: GLASSES
OD_PH_CC: 20/40

## 2023-09-26 ASSESSMENT — TONOMETRY
OD_IOP_MMHG: 19
IOP_METHOD: APPLANATION
OS_IOP_MMHG: 19

## 2023-09-26 ASSESSMENT — REFRACTION_MANIFEST
OS_AXIS: 180
OS_ADD: +3.00
OS_CYLINDER: +1.50
OD_SPHERE: +3.00
OD_CYLINDER: +2.00
OD_ADD: +3.00
OD_AXIS: 011
OS_SPHERE: +3.00

## 2023-09-26 ASSESSMENT — EXTERNAL EXAM - RIGHT EYE: OD_EXAM: 2+ BROW PTOSIS

## 2023-09-26 ASSESSMENT — CUP TO DISC RATIO
OS_RATIO: 0.4
OD_RATIO: 0.4

## 2023-09-26 ASSESSMENT — SLIT LAMP EXAM - LIDS
COMMENTS: 2-3+ DERMATOCHALASIS - UPPER LID
COMMENTS: 1-2+ DERMATOCHALASIS

## 2023-09-26 NOTE — LETTER
"    9/26/2023         RE: Vivian Carlin  [address] [place] [address]        Dear Colleague,    Thank you for referring your patient, Vivian Carlin, to the [facility]. Please see a copy of my visit note below.     Current Eye Medications:  systane both eyes daily.       Subjective:  Comprehensive Eye Exam.  Patient complains of her left eye appearing smaller than her right eye.  One week ago, she noticed some \"rough skin\" on her left upper eyelid, so she used some over-the-counter Hydrocortisone cream once, which resolved the roughness.  Also her vision is decreasing, especially when looking in the distance, in both eyes-  \"something is wrong with my eyes.\"     Objective:  See Ophthalmology Exam.       Assessment:  Mild worsening of cataracts both eyes.  Probably visually significant dermatochalasis upper lids.      ICD-10-CM    1. Combined forms of age-related cataract, mild-mod, of both eyes  H25.813       2. Dermatochalasis of both upper eyelids  H02.831     H02.834       3. 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 Posterior vitreous detachment of both eyes  H43.813       5. Encounter for examination of eyes and vision with abnormal findings  Z01.01       6. Presbyopia  H52.4            Plan:  Glasses prescription given - optional    May use artificial tears up to four times a day (like Refresh Optive, Systane Balance, TheraTears, or generic artificial tears are ok. Avoid \"get the red out\" drops).     Can continue to use Hydrocortisone cream on the lids to help with dry skin    Can send a referral to Dr. Mitchel Billings for an evaluation of the eye lids at any time if you desire.     Call in May 2024 for an appointment in September 2024 for Complete Exam    Dr. Babin [phone]         Again, thank you for allowing me to participate in the care of your patient.        Sincerely,        Augusto Babin MD  "

## 2023-09-26 NOTE — PATIENT INSTRUCTIONS
"Glasses prescription given - optional    May use artificial tears up to four times a day (like Refresh Optive, Systane Balance, TheraTears, or generic artificial tears are ok. Avoid \"get the red out\" drops).     Can continue to use Hydrocortisone cream on the lids to help with dry skin    Can send a referral to Dr. Mitchel Billings for an evaluation of the eye lids at any time if you desire.     Call in May 2024 for an appointment in September 2024 for Complete Exam    Dr. Babin [phone]    "

## 2023-09-26 NOTE — PROGRESS NOTES
" Current Eye Medications:  systane both eyes daily.       Subjective:  Comprehensive Eye Exam.  Patient complains of her left eye appearing smaller than her right eye.  One week ago, she noticed some \"rough skin\" on her left upper eyelid, so she used some over-the-counter Hydrocortisone cream once, which resolved the roughness.  Also her vision is decreasing, especially when looking in the distance, in both eyes-  \"something is wrong with my eyes.\"     Objective:  See Ophthalmology Exam.       Assessment:  Mild worsening of cataracts both eyes.  Probably visually significant dermatochalasis upper lids.      ICD-10-CM    1. Combined forms of age-related cataract, mild-mod, of both eyes  H25.813       2. Dermatochalasis of both upper eyelids  H02.831     H02.834       3. 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 Posterior vitreous detachment of both eyes  H43.813       5. Encounter for examination of eyes and vision with abnormal findings  Z01.01       6. Presbyopia  H52.4            Plan:  Glasses prescription given - optional    May use artificial tears up to four times a day (like Refresh Optive, Systane Balance, TheraTears, or generic artificial tears are ok. Avoid \"get the red out\" drops).     Can continue to use Hydrocortisone cream on the lids to help with dry skin    Can send a referral to Dr. Mitchel Billings for an evaluation of the eye lids at any time if you desire.     Call in May 2024 for an appointment in September 2024 for Complete Exam    Dr. Babin [phone]       "

## 2023-10-01 PROBLEM — H25.813 COMBINED FORMS OF AGE-RELATED CATARACT OF BOTH EYES: Status: ACTIVE | Noted: 2019-11-01

## 2023-10-01 PROBLEM — H02.831 DERMATOCHALASIS OF BOTH UPPER EYELIDS: Status: ACTIVE | Noted: 2023-10-01

## 2023-10-01 PROBLEM — H02.834 DERMATOCHALASIS OF BOTH UPPER EYELIDS: Status: ACTIVE | Noted: 2023-10-01

## 2023-10-01 ASSESSMENT — EXTERNAL EXAM - LEFT EYE: OS_EXAM: 2+ BROW PTOSIS
